# Patient Record
Sex: MALE | Race: WHITE | HISPANIC OR LATINO | Employment: FULL TIME | URBAN - METROPOLITAN AREA
[De-identification: names, ages, dates, MRNs, and addresses within clinical notes are randomized per-mention and may not be internally consistent; named-entity substitution may affect disease eponyms.]

---

## 2019-01-11 ENCOUNTER — OFFICE VISIT (OUTPATIENT)
Dept: INTERNAL MEDICINE | Facility: CLINIC | Age: 34
End: 2019-01-11
Payer: OTHER GOVERNMENT

## 2019-01-11 VITALS
HEART RATE: 78 BPM | DIASTOLIC BLOOD PRESSURE: 94 MMHG | OXYGEN SATURATION: 99 % | SYSTOLIC BLOOD PRESSURE: 140 MMHG | BODY MASS INDEX: 26.52 KG/M2 | WEIGHT: 175 LBS | HEIGHT: 68 IN | TEMPERATURE: 99 F

## 2019-01-11 DIAGNOSIS — F90.9 ATTENTION DEFICIT HYPERACTIVITY DISORDER (ADHD), UNSPECIFIED ADHD TYPE: ICD-10-CM

## 2019-01-11 DIAGNOSIS — G89.29 CHRONIC LOW BACK PAIN, UNSPECIFIED BACK PAIN LATERALITY, WITH SCIATICA PRESENCE UNSPECIFIED: ICD-10-CM

## 2019-01-11 DIAGNOSIS — K21.9 GASTROESOPHAGEAL REFLUX DISEASE, ESOPHAGITIS PRESENCE NOT SPECIFIED: ICD-10-CM

## 2019-01-11 DIAGNOSIS — M51.26 HERNIATED LUMBAR INTERVERTEBRAL DISC: ICD-10-CM

## 2019-01-11 DIAGNOSIS — Z91.09 ENVIRONMENTAL ALLERGIES: ICD-10-CM

## 2019-01-11 DIAGNOSIS — E66.3 OVERWEIGHT (BMI 25.0-29.9): ICD-10-CM

## 2019-01-11 DIAGNOSIS — I10 ESSENTIAL HYPERTENSION: ICD-10-CM

## 2019-01-11 DIAGNOSIS — M54.5 CHRONIC LOW BACK PAIN, UNSPECIFIED BACK PAIN LATERALITY, WITH SCIATICA PRESENCE UNSPECIFIED: ICD-10-CM

## 2019-01-11 DIAGNOSIS — E78.5 HYPERLIPIDEMIA, UNSPECIFIED HYPERLIPIDEMIA TYPE: ICD-10-CM

## 2019-01-11 DIAGNOSIS — F41.9 ANXIETY: ICD-10-CM

## 2019-01-11 PROBLEM — M54.50 CHRONIC LOW BACK PAIN: Status: ACTIVE | Noted: 2019-01-11

## 2019-01-11 PROCEDURE — 99999 PR PBB SHADOW E&M-NEW PATIENT-LVL III: CPT | Mod: PBBFAC,,, | Performed by: FAMILY MEDICINE

## 2019-01-11 PROCEDURE — 99385 PREV VISIT NEW AGE 18-39: CPT | Mod: S$GLB,,, | Performed by: FAMILY MEDICINE

## 2019-01-11 PROCEDURE — 99385 PR PREVENTIVE VISIT,NEW,18-39: ICD-10-PCS | Mod: S$GLB,,, | Performed by: FAMILY MEDICINE

## 2019-01-11 PROCEDURE — 99999 PR PBB SHADOW E&M-NEW PATIENT-LVL III: ICD-10-PCS | Mod: PBBFAC,,, | Performed by: FAMILY MEDICINE

## 2019-01-11 RX ORDER — OMEPRAZOLE 40 MG/1
40 CAPSULE, DELAYED RELEASE ORAL DAILY
Refills: 1 | COMMUNITY
Start: 2018-12-08 | End: 2019-01-22 | Stop reason: SDUPTHER

## 2019-01-11 RX ORDER — FLUOXETINE 10 MG/1
10 TABLET ORAL DAILY
COMMUNITY
End: 2020-01-10

## 2019-01-11 RX ORDER — METOPROLOL TARTRATE 50 MG/1
50 TABLET ORAL 2 TIMES DAILY
COMMUNITY
End: 2019-01-11 | Stop reason: SDUPTHER

## 2019-01-11 RX ORDER — METOPROLOL TARTRATE 50 MG/1
50 TABLET ORAL 2 TIMES DAILY
Qty: 180 TABLET | Refills: 3 | Status: SHIPPED | OUTPATIENT
Start: 2019-01-11 | End: 2020-01-10 | Stop reason: SDUPTHER

## 2019-01-11 RX ORDER — LEVOCETIRIZINE DIHYDROCHLORIDE 5 MG/1
5 TABLET, FILM COATED ORAL DAILY
Refills: 9 | COMMUNITY
Start: 2018-11-23 | End: 2020-02-04 | Stop reason: SDUPTHER

## 2019-01-11 RX ORDER — AZELASTINE HYDROCHLORIDE, FLUTICASONE PROPIONATE 137; 50 UG/1; UG/1
1 SPRAY, METERED NASAL 2 TIMES DAILY
COMMUNITY
End: 2020-06-24
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
## 2019-01-13 ENCOUNTER — PATIENT MESSAGE (OUTPATIENT)
Dept: INTERNAL MEDICINE | Facility: CLINIC | Age: 34
End: 2019-01-13

## 2019-01-15 ENCOUNTER — TELEPHONE (OUTPATIENT)
Dept: INTERNAL MEDICINE | Facility: CLINIC | Age: 34
End: 2019-01-15

## 2019-01-15 ENCOUNTER — LAB VISIT (OUTPATIENT)
Dept: LAB | Facility: HOSPITAL | Age: 34
End: 2019-01-15
Payer: OTHER GOVERNMENT

## 2019-01-15 DIAGNOSIS — I10 ESSENTIAL HYPERTENSION: ICD-10-CM

## 2019-01-15 DIAGNOSIS — R74.01 ELEVATED ALT MEASUREMENT: Primary | ICD-10-CM

## 2019-01-15 DIAGNOSIS — E78.5 HYPERLIPIDEMIA, UNSPECIFIED HYPERLIPIDEMIA TYPE: ICD-10-CM

## 2019-01-15 LAB
ALBUMIN SERPL BCP-MCNC: 4.2 G/DL
ALP SERPL-CCNC: 53 U/L
ALT SERPL W/O P-5'-P-CCNC: 82 U/L
ANION GAP SERPL CALC-SCNC: 8 MMOL/L
AST SERPL-CCNC: 38 U/L
BASOPHILS # BLD AUTO: 0.02 K/UL
BASOPHILS NFR BLD: 0.4 %
BILIRUB SERPL-MCNC: 0.5 MG/DL
BUN SERPL-MCNC: 17 MG/DL
CALCIUM SERPL-MCNC: 9.2 MG/DL
CHLORIDE SERPL-SCNC: 102 MMOL/L
CHOLEST SERPL-MCNC: 288 MG/DL
CHOLEST/HDLC SERPL: 7.2 {RATIO}
CO2 SERPL-SCNC: 27 MMOL/L
CREAT SERPL-MCNC: 0.8 MG/DL
DIFFERENTIAL METHOD: NORMAL
EOSINOPHIL # BLD AUTO: 0.3 K/UL
EOSINOPHIL NFR BLD: 5.1 %
ERYTHROCYTE [DISTWIDTH] IN BLOOD BY AUTOMATED COUNT: 12.7 %
EST. GFR  (AFRICAN AMERICAN): >60 ML/MIN/1.73 M^2
EST. GFR  (NON AFRICAN AMERICAN): >60 ML/MIN/1.73 M^2
GLUCOSE SERPL-MCNC: 103 MG/DL
HCT VFR BLD AUTO: 43.1 %
HDLC SERPL-MCNC: 40 MG/DL
HDLC SERPL: 13.9 %
HGB BLD-MCNC: 14.6 G/DL
LDLC SERPL CALC-MCNC: 184.8 MG/DL
LYMPHOCYTES # BLD AUTO: 1.3 K/UL
LYMPHOCYTES NFR BLD: 26.3 %
MCH RBC QN AUTO: 30.2 PG
MCHC RBC AUTO-ENTMCNC: 33.9 G/DL
MCV RBC AUTO: 89 FL
MONOCYTES # BLD AUTO: 0.4 K/UL
MONOCYTES NFR BLD: 7.9 %
NEUTROPHILS # BLD AUTO: 3 K/UL
NEUTROPHILS NFR BLD: 60.1 %
NONHDLC SERPL-MCNC: 248 MG/DL
PLATELET # BLD AUTO: 202 K/UL
PMV BLD AUTO: 12.1 FL
POTASSIUM SERPL-SCNC: 3.8 MMOL/L
PROT SERPL-MCNC: 7.2 G/DL
RBC # BLD AUTO: 4.83 M/UL
SODIUM SERPL-SCNC: 137 MMOL/L
TRIGL SERPL-MCNC: 316 MG/DL
WBC # BLD AUTO: 5.05 K/UL

## 2019-01-15 PROCEDURE — 80061 LIPID PANEL: CPT

## 2019-01-15 PROCEDURE — 80053 COMPREHEN METABOLIC PANEL: CPT

## 2019-01-15 PROCEDURE — 36415 COLL VENOUS BLD VENIPUNCTURE: CPT

## 2019-01-15 PROCEDURE — 85025 COMPLETE CBC W/AUTO DIFF WBC: CPT

## 2019-01-15 RX ORDER — PRAVASTATIN SODIUM 20 MG/1
20 TABLET ORAL DAILY
Qty: 90 TABLET | Refills: 3 | Status: SHIPPED | OUTPATIENT
Start: 2019-01-15 | End: 2019-02-14

## 2019-01-15 RX ORDER — ATORVASTATIN CALCIUM 20 MG/1
20 TABLET, FILM COATED ORAL DAILY
Qty: 90 TABLET | Refills: 3 | Status: SHIPPED | OUTPATIENT
Start: 2019-01-15 | End: 2019-01-15 | Stop reason: ALTCHOICE

## 2019-01-15 NOTE — TELEPHONE ENCOUNTER
Message left for pt to call office  Changed Rx to pravastatin & sent electronically; please contact patient's pharmacy and update them of this change and cancel atorvastatin Rx.  Pravastatin less likely to cause cramps.  Please notify patient once complete.

## 2019-01-15 NOTE — TELEPHONE ENCOUNTER
----- Message from Juan Ley MD sent at 1/15/2019  1:25 PM CST -----  Results reviewed.  Elevated liver enzyme noted with ALT elevated at 82; will need ABD US and hepatitis panel for further evaluation (ordered electronically).  Also noted to have significantly elevated total cholesterol at 288 (normal < 200), LDL cholesterol at 184 (normal < 160), and triglycerides at 316 (normal 30 - 150).  I would recommend the patient start a daily cholesterol medication at this time, atorvastatin 20 mg by mouth daily; this has been sent electronically.  This Rx may cause muscle cramps, please notify me if this occurs.  CBC within normal limits.  Please notify patient.

## 2019-01-15 NOTE — TELEPHONE ENCOUNTER
----- Message from Lyssa Blevins sent at 1/15/2019  2:20 PM CST -----  Contact: Pt Mobile [phone]   Patient is returning a phone call.  Who left a message for the patient: Maribel   Does patient know what this is regarding:  A message from Maribel   Comments:

## 2019-01-15 NOTE — TELEPHONE ENCOUNTER
Spoke with pt, notified him of results, pt verbalized understanding. Appts  scheduled. Pt asked to resend a different statin since he has already been on thi atorvastatin and it caused him leg cramps. Pt states that he is willing to get on the statin only for the short period of time and doesn't want to be on it all the time.

## 2019-01-15 NOTE — TELEPHONE ENCOUNTER
Changed Rx to pravastatin & sent electronically; please contact patient's pharmacy and update them of this change and cancel atorvastatin Rx.  Pravastatin less likely to cause cramps.  Please notify patient once complete.

## 2019-01-16 ENCOUNTER — TELEPHONE (OUTPATIENT)
Dept: INTERNAL MEDICINE | Facility: CLINIC | Age: 34
End: 2019-01-16

## 2019-01-16 NOTE — TELEPHONE ENCOUNTER
Can be temporary with appropriate diet and lifestyle modifications.  Can discuss going off this medication and recheck of lipids in 6 months if patient would like, would need follow up appointment at that time.  Please notify patient.

## 2019-01-16 NOTE — TELEPHONE ENCOUNTER
----- Message from Natalie Ansari sent at 1/15/2019  3:48 PM CST -----  Contact: self/[phone]  Type: Returning a call    Who left a message? Yany Turner     When did the practice call? Today     Comments: Please advise.        Thanks

## 2019-01-16 NOTE — TELEPHONE ENCOUNTER
Pt wants to know if the cholesterol medication is temporary until his numbers improve or do he have to take this long term   appts scheduled

## 2019-01-16 NOTE — TELEPHONE ENCOUNTER
Message left for pt to call office  Hep panel and ABD US ordered; please process and notify patient once complete

## 2019-01-22 ENCOUNTER — PATIENT MESSAGE (OUTPATIENT)
Dept: INTERNAL MEDICINE | Facility: CLINIC | Age: 34
End: 2019-01-22

## 2019-01-22 RX ORDER — OMEPRAZOLE 40 MG/1
40 CAPSULE, DELAYED RELEASE ORAL DAILY
Qty: 30 CAPSULE | Refills: 11 | Status: SHIPPED | OUTPATIENT
Start: 2019-01-22 | End: 2020-01-10 | Stop reason: SDUPTHER

## 2019-01-26 ENCOUNTER — HOSPITAL ENCOUNTER (OUTPATIENT)
Dept: RADIOLOGY | Facility: HOSPITAL | Age: 34
Discharge: HOME | End: 2019-01-26
Attending: FAMILY MEDICINE
Payer: OTHER GOVERNMENT

## 2019-01-26 DIAGNOSIS — R74.01 ELEVATED ALT MEASUREMENT: ICD-10-CM

## 2019-01-26 PROCEDURE — 76700 US ABDOMEN COMPLETE: ICD-10-PCS | Mod: 26,,, | Performed by: RADIOLOGY

## 2019-01-26 PROCEDURE — 76700 US EXAM ABDOM COMPLETE: CPT | Mod: TC

## 2019-01-26 PROCEDURE — 76700 US EXAM ABDOM COMPLETE: CPT | Mod: 26,,, | Performed by: RADIOLOGY

## 2019-02-02 ENCOUNTER — OFFICE VISIT (OUTPATIENT)
Dept: URGENT CARE | Facility: CLINIC | Age: 34
End: 2019-02-02
Payer: OTHER GOVERNMENT

## 2019-02-02 ENCOUNTER — PATIENT MESSAGE (OUTPATIENT)
Dept: INTERNAL MEDICINE | Facility: CLINIC | Age: 34
End: 2019-02-02

## 2019-02-02 VITALS
HEART RATE: 83 BPM | BODY MASS INDEX: 24.1 KG/M2 | OXYGEN SATURATION: 97 % | SYSTOLIC BLOOD PRESSURE: 140 MMHG | TEMPERATURE: 98 F | RESPIRATION RATE: 16 BRPM | DIASTOLIC BLOOD PRESSURE: 90 MMHG | HEIGHT: 68 IN | WEIGHT: 159 LBS

## 2019-02-02 DIAGNOSIS — R25.2 MUSCLE CRAMPS: ICD-10-CM

## 2019-02-02 DIAGNOSIS — T88.7XXA SIDE EFFECT OF MEDICATION: Primary | ICD-10-CM

## 2019-02-02 PROCEDURE — 99214 PR OFFICE/OUTPT VISIT, EST, LEVL IV, 30-39 MIN: ICD-10-PCS | Mod: S$GLB,,, | Performed by: NURSE PRACTITIONER

## 2019-02-02 PROCEDURE — 99214 OFFICE O/P EST MOD 30 MIN: CPT | Mod: S$GLB,,, | Performed by: NURSE PRACTITIONER

## 2019-02-02 NOTE — PROGRESS NOTES
"Subjective:       Patient ID: Manoj Gutierrez is a 33 y.o. male.    Vitals:  height is 5' 7.5" (1.715 m) and weight is 72.1 kg (159 lb). His temperature is 98.3 °F (36.8 °C). His blood pressure is 140/90 (abnormal) and his pulse is 83. His respiration is 16 and oxygen saturation is 97%.     Chief Complaint: Leg Pain    33-year-old male presents today with bilateral leg pain and calf  cramping  He states he runs 4-5 miles per day.   Reports eating a well-balanced meal and remains hydrated.He started with a new medication (pravastatin) in over 2 weeks ago and reports that the symptoms started afterwards.    Calf cramping that is pretty is constant. Pt states it started on Wednesday. Pt states right leg is sore but the left leg is the worse.  Denies unilateral leg swelling.          Constitution: Negative for chills, fatigue and fever.   HENT: Negative for congestion and sore throat.    Neck: Negative for painful lymph nodes.   Cardiovascular: Negative for chest pain and leg swelling.   Eyes: Negative for double vision and blurred vision.   Respiratory: Negative for cough and shortness of breath.    Gastrointestinal: Negative for nausea, vomiting and diarrhea.   Genitourinary: Negative for dysuria, frequency and urgency.   Musculoskeletal: Negative for joint pain, joint swelling, muscle cramps and muscle ache.   Skin: Negative for color change, pale and rash.   Allergic/Immunologic: Negative for seasonal allergies.   Neurological: Negative for dizziness, history of vertigo, light-headedness, passing out and headaches.   Hematologic/Lymphatic: Negative for swollen lymph nodes, easy bruising/bleeding and history of blood clots. Does not bruise/bleed easily.   Psychiatric/Behavioral: Negative for nervous/anxious, sleep disturbance and depression. The patient is not nervous/anxious.        Objective:      Physical Exam   Constitutional: He is oriented to person, place, and time. He appears well-developed and " well-nourished.   Cardiovascular: Normal rate, regular rhythm, normal heart sounds and intact distal pulses.   Pulmonary/Chest: Effort normal and breath sounds normal.   Musculoskeletal: Normal range of motion. He exhibits no tenderness.     Negative Homans sign bilaterally.   No unilateral leg swelling noted.   Neurological: He is alert and oriented to person, place, and time.   Skin: Skin is warm and dry. Capillary refill takes less than 2 seconds.   Psychiatric: He has a normal mood and affect. His behavior is normal. Judgment and thought content normal.   Nursing note and vitals reviewed.      Assessment:       1. Side effect of medication    2. Muscle cramps        Plan:         Side effect of medication    Muscle cramps            Patient Instructions      Speak to her primary care physician on Monday regarding medication.    You must understand that you've received an Urgent Care treatment only and that you may be released before all your medical problems are known or treated. You, the patient, will arrange for follow up care as instructed.  If your condition worsens we recommend that you receive another evaluation at the emergency room immediately or contact your primary medical clinics after hours call service to discuss your concerns.  Please return here or go to the Emergency Department for any concerns or worsening of condition.      Muscle Spasm  A muscle spasm (also called a cramp) is an involuntary muscle contraction. The muscle tightens quickly and strongly. A hard lump may form in the muscle. Muscle spasms are very painful. Read on to learn more about muscle spasms and how to treat and prevent them.    What causes muscles to spasm?  Often, the cause of a muscle spasm is not known. Muscle spasm is due to irritation of muscle fibers. Some things can make a muscle spasm more likely. These include:  · Injury  · Heavy exercise  · Overtired muscles  · A muscle held in one position for a long  time  · Dehydration  · Low levels of certain minerals in the body  · Taking certain medications, such as diuretics or water pills  · Certain medical conditions, such as kidney failure or diabetes  · Being pregnant  Stopping a muscle spasm  Muscle spasms often come and go quickly. When a muscle goes into spasm, very gently stretch and massage the muscle. This may help calm the muscle fibers. Then rest the muscle.  Preventing muscle spasms  Although there is little or no evidence that staying hydrated, taking certain vitamins or minerals or stretching works to prevent cramps, these measures may help and have other benefits. Talk to your health care provider about steps to take to avoid muscle spasms. These may include:  · Drinking enough fluids to avoid dehydration, especially when you exercise.  · Taking vitamin or mineral supplements.  · Getting regular exercise.  · Stretching regularly, especially before exercise.  · Limit caffeine and smoking.  · Taking a prescription muscle relaxant.  When to call your doctor  Call your doctor if you have any of the following:  · Severe cramping  · Cramping that lasts a long time, does not go away with stretching, or keeps coming back  · Pain, tingling, or weakness in the arms or legs  · Pain that wakes you up at night   Date Last Reviewed: 9/1/2015  © 6049-2381 [employer]. [address] [place] [address], [place] [address]. All rights reserved. This information is not intended as a substitute for professional medical care. Always follow your healthcare professional's instructions.

## 2019-02-02 NOTE — LETTER
February 2, 2019      [facility]  [address] [place] [address]  Phone: [phone]  Fax: [phone]       Patient: Manoj Gutierrez   YOB: 1985  Date of Visit: 02/02/2019    To Whom It May Concern:    Dov Gutierrez  was at [facility] on 02/02/2019. He may return to work/school on 02/03/2019 with no restrictions. If you have any questions or concerns, or if I can be of further assistance, please do not hesitate to contact me.    Sincerely,      Isabela Romero NP

## 2019-02-03 NOTE — PATIENT INSTRUCTIONS
Speak to her primary care physician on Monday regarding medication.    You must understand that you've received an Urgent Care treatment only and that you may be released before all your medical problems are known or treated. You, the patient, will arrange for follow up care as instructed.  If your condition worsens we recommend that you receive another evaluation at the emergency room immediately or contact your primary medical clinics after hours call service to discuss your concerns.  Please return here or go to the Emergency Department for any concerns or worsening of condition.      Muscle Spasm  A muscle spasm (also called a cramp) is an involuntary muscle contraction. The muscle tightens quickly and strongly. A hard lump may form in the muscle. Muscle spasms are very painful. Read on to learn more about muscle spasms and how to treat and prevent them.    What causes muscles to spasm?  Often, the cause of a muscle spasm is not known. Muscle spasm is due to irritation of muscle fibers. Some things can make a muscle spasm more likely. These include:  · Injury  · Heavy exercise  · Overtired muscles  · A muscle held in one position for a long time  · Dehydration  · Low levels of certain minerals in the body  · Taking certain medications, such as diuretics or water pills  · Certain medical conditions, such as kidney failure or diabetes  · Being pregnant  Stopping a muscle spasm  Muscle spasms often come and go quickly. When a muscle goes into spasm, very gently stretch and massage the muscle. This may help calm the muscle fibers. Then rest the muscle.  Preventing muscle spasms  Although there is little or no evidence that staying hydrated, taking certain vitamins or minerals or stretching works to prevent cramps, these measures may help and have other benefits. Talk to your health care provider about steps to take to avoid muscle spasms. These may include:  · Drinking enough fluids to avoid dehydration, especially  when you exercise.  · Taking vitamin or mineral supplements.  · Getting regular exercise.  · Stretching regularly, especially before exercise.  · Limit caffeine and smoking.  · Taking a prescription muscle relaxant.  When to call your doctor  Call your doctor if you have any of the following:  · Severe cramping  · Cramping that lasts a long time, does not go away with stretching, or keeps coming back  · Pain, tingling, or weakness in the arms or legs  · Pain that wakes you up at night   Date Last Reviewed: 9/1/2015  © 3781-6584 [employer]. [address] [place] [address]. All rights reserved. This information is not intended as a substitute for professional medical care. Always follow your healthcare professional's instructions.

## 2019-02-14 ENCOUNTER — OFFICE VISIT (OUTPATIENT)
Dept: INTERNAL MEDICINE | Facility: CLINIC | Age: 34
End: 2019-02-14
Payer: OTHER GOVERNMENT

## 2019-02-14 VITALS
OXYGEN SATURATION: 100 % | HEART RATE: 97 BPM | SYSTOLIC BLOOD PRESSURE: 126 MMHG | TEMPERATURE: 98 F | WEIGHT: 173 LBS | DIASTOLIC BLOOD PRESSURE: 80 MMHG | HEIGHT: 68 IN | BODY MASS INDEX: 26.22 KG/M2

## 2019-02-14 DIAGNOSIS — E78.5 HYPERLIPIDEMIA, UNSPECIFIED HYPERLIPIDEMIA TYPE: Primary | ICD-10-CM

## 2019-02-14 PROCEDURE — 99213 OFFICE O/P EST LOW 20 MIN: CPT | Mod: S$GLB,,, | Performed by: FAMILY MEDICINE

## 2019-02-14 PROCEDURE — 99999 PR PBB SHADOW E&M-EST. PATIENT-LVL IV: CPT | Mod: PBBFAC,,, | Performed by: FAMILY MEDICINE

## 2019-02-14 PROCEDURE — 99999 PR PBB SHADOW E&M-EST. PATIENT-LVL IV: ICD-10-PCS | Mod: PBBFAC,,, | Performed by: FAMILY MEDICINE

## 2019-02-14 PROCEDURE — 99213 PR OFFICE/OUTPT VISIT, EST, LEVL III, 20-29 MIN: ICD-10-PCS | Mod: S$GLB,,, | Performed by: FAMILY MEDICINE

## 2019-02-14 RX ORDER — ATORVASTATIN CALCIUM 20 MG/1
TABLET, FILM COATED ORAL
COMMUNITY
Start: 2019-01-15 | End: 2019-02-14 | Stop reason: ALTCHOICE

## 2019-02-14 NOTE — PATIENT INSTRUCTIONS
Lifestyle Changes to Control Cholesterol  You can control your cholesterol through diet, exercise, weight management, quitting smoking, stress management, and taking your medicines right. These things can also lower your risk for cardiovascular disease.    Eating healthy  Your healthcare provider will give you information on diet changes you may need to make. Your provider may recommend that you see a registered dietitian for help with diet changes. Changes may include:  · Cutting back on the amount of fat and cholesterol in your meals  · Eating less salt (sodium). This is especially important if you have high blood pressure.  · Eating more fresh vegetables and fruits  · Eating lean proteins such as fish, poultry, beans, and peas  · Eating less red meat and processed meats  · Using low-fat dairy products  · Using vegetable and nut oils in limited amounts  · Limiting how many sweets and processed foods like chips, cookies, and baked goods that you eat   · Limiting how many sugar-sweetened beverages you drink  · Limiting how often you eat out  Getting exercise  Regular exercise is a good way to help your body control cholesterol. Regular exercise can help in many ways. It can:  · Raise your good cholesterol  · Help lower your bad cholesterol  · Let blood flow better through your body  · Give more oxygen to your muscles and tissues  · Help you manage your weight  · Help your heart pump better  · Lower your blood pressure  Your healthcare provider may recommend that you get more physical activity if you haven't been active. Your provider may recommend that you get moderate to vigorous physical activity for at least 40 minutes each day. You should do this for at least 3 to 4 days each week. A few examples of moderate to vigorous activity are:  · Walking at a brisk pace. This is about 3 to 4 miles per hour.  · Jogging or running  · Swimming or water aerobics  · Hiking  · Dancing  · Martial arts  · Tennis  · Riding a  bicycle or stationary bike  · Dancing  Managing your weight  If you are overweight or obese, your healthcare provider will work with you to help you lose weight and lower your BMI (body mass index). Making diet changes and getting more physical activity can help. Changing your diet will help you lose weight more easily than adding exercise.  Quitting smoking  Smoking and other tobacco use can raise cholesterol and make it harder to control. Quitting is tough. But millions of people have given up tobacco for good. You can quit, too! Think about some of the reasons below to quit smoking. Do any of them make you think twice about your smoking habit?  Stop smoking because it:  · Keeps your cholesterol high, even if you make all the other changes youre supposed to  · Damages your body. It especially harms your heart, lungs, skin, and blood vessels.  · Makes you more likely to have a heart attack (acute myocardial infarction), stroke, or cancer  · Stains your teeth  · Makes your skin, clothes, and breath smell bad  · Costs a lot of money  Controlling stress   Learn ways to control stress. This will help you deal with stress in your home and work life. Controlling stress can greatly lower your risk of getting cardiovascular disease.  Making the most of medicines  Healthy eating and exercise are a good start to keeping your cholesterol down. But you may need some extra help from medicine. If your doctor prescribes medicine, be sure to take it exactly as directed. Remember:  · Tell your healthcare provider about all other medicines you take. This includes vitamins and herbs.  · Tell your healthcare provider if you have any side effects after starting to take a medicine. Examples of side effects to watch for include muscle aches, weakness, blurred vision, rust-colored urine, yellowing of eyes or skin (jaundice), and headache.  · Dont skip a dose or stop taking your medicine because you feel better or because  your cholesterol numbers go down. Never stop taking your medicine unless your healthcare provider has told you its OK.  · Ask your healthcare provider if you have any questions about your medicines.  High risk groups  Some people may need to take medicines called statins to control their cholesterol. This is in addition to eating a healthy diet and getting regular exercise.  Statins can help you stay healthy. They can also help prevent a heart attack or stroke. You may need to take a statin if you are in one of these groups:  · Adults who have had a heart attack or stroke. Or adults who have had peripheral vascular disease, a ministroke (transient ischemic attack), or stable or unstable angina. This group also includes people who have had a procedure to restore blood flow through a blocked artery. These procedures include percutaneous coronary intervention, angioplasty, stent, and open-heart bypass surgery.  · Adults who have diabetes. Or adults who are at higher risk of having a heart attack or stroke and have an LDL cholesterol level of 70 to 189 mg/dL  · Adults who are 21 years old or older and have an LDL cholesterol level of 190 mg/dL or higher.  If you are in a high-risk group, talk with your healthcare provider about your treatment goals. Make sure you understand why these goals are important, based on your own health history and your family history of heart disease or high cholesterol.  Make a plan to have regular cholesterol checks. You may need to fast before getting this test. Also ask your provider about any side effects your medicines may cause. Let your provider know about any side effects you have. You may need to take more than one medicine to reach the cholesterol goals that you and your provider decide on.  Date Last Reviewed: 10/1/2016  © 9731-2902 [employer]. [address] [place] [address], [place] [address]. All rights reserved. This information is not intended as a substitute for  professional medical care. Always follow your healthcare professional's instructions.

## 2019-02-14 NOTE — PROGRESS NOTES
Subjective:      Patient ID: Manoj Gutierrez is a 33 y.o. male.    Chief Complaint: Follow-up      HPI:  Manoj Gutierrez is a 33 year old male with ADHD, back pain, environmental allergies, GERD, hyperlipidemia, and hypertension who presents to clinic today for follow up on hyperlipidemia.    Unable to tolerate statins (atorvastatin and pravastatin) 2/2 myopathy.  Tried coenzyme Q 10 in addition to his statin but this did not improve his myalgias.  .8, total cholesterol 288 on lipid panel 1/15/19.  Enquires about Zetia.  Has elevated liver enzymes as well.  Runs regularly.  Eats 3+ avocados daily.  Has tried diet and lifestyle modifications with 2 previous doctors for his hyperlipidemia with only minimal improvement.      Past Medical History:   Diagnosis Date    Elevated transaminase level     Hyperlipidemia     Overweight (BMI 25.0-29.9)        History reviewed. No pertinent surgical history.    History reviewed. No pertinent family history.    Social History     Socioeconomic History    Marital status: Single     Spouse name: None    Number of children: None    Years of education: None    Highest education level: None   Social Needs    Financial resource strain: None    Food insecurity - worry: None    Food insecurity - inability: None    Transportation needs - medical: None    Transportation needs - non-medical: None   Occupational History    None   Tobacco Use    Smoking status: Never Smoker    Smokeless tobacco: Never Used   Substance and Sexual Activity    Alcohol use: Yes     Frequency: 2-3 times a week     Drinks per session: 3 or 4    Drug use: No    Sexual activity: No   Other Topics Concern    None   Social History Narrative    None       Review of Systems   Constitutional: Negative for activity change, chills, fatigue, fever and unexpected weight change.   HENT: Negative for congestion, hearing loss, nosebleeds, sore throat and trouble swallowing.    Eyes: Negative for pain  "and visual disturbance.   Respiratory: Negative for cough, chest tightness, shortness of breath and wheezing.    Cardiovascular: Negative for chest pain and palpitations.   Gastrointestinal: Negative for abdominal distention, abdominal pain, blood in stool, constipation, diarrhea, nausea and vomiting.   Endocrine: Negative for polydipsia and polyuria.   Genitourinary: Negative for difficulty urinating, dysuria, frequency, hematuria and urgency.   Musculoskeletal: Negative for arthralgias, back pain, joint swelling, myalgias and neck pain.   Skin: Negative for color change and rash.   Neurological: Negative for dizziness, syncope, speech difficulty, weakness, numbness and headaches.   Psychiatric/Behavioral: Negative for agitation, behavioral problems, confusion and dysphoric mood. The patient is not nervous/anxious.      Objective:     Vitals:    02/14/19 1627   BP: 126/80   BP Location: Right arm   Patient Position: Sitting   BP Method: Medium (Manual)   Pulse: 97   Temp: 98.2 °F (36.8 °C)   TempSrc: Oral   SpO2: 100%   Weight: 78.5 kg (173 lb)   Height: 5' 7.5" (1.715 m)       Physical Exam   Constitutional: He appears well-developed and well-nourished. He is cooperative. No distress.   HENT:   Head: Normocephalic and atraumatic.   Right Ear: Hearing and external ear normal.   Left Ear: Hearing and external ear normal.   Nose: Nose normal. No rhinorrhea. No epistaxis.   Mouth/Throat: Oropharynx is clear and moist and mucous membranes are normal. No oral lesions.   Eyes: Conjunctivae, EOM and lids are normal. Pupils are equal, round, and reactive to light. Right eye exhibits no discharge. Left eye exhibits no discharge.   Neck: Trachea normal and normal range of motion. Neck supple. No tracheal deviation present.   Cardiovascular: Normal rate, regular rhythm and normal heart sounds. Exam reveals no gallop and no friction rub.   No murmur heard.  Pulmonary/Chest: Effort normal and breath sounds normal. No " respiratory distress. He has no wheezes. He has no rales.   Abdominal: Soft. Bowel sounds are normal. He exhibits no distension. There is no tenderness. There is no rebound and no guarding.   Musculoskeletal: Normal range of motion. He exhibits no edema or deformity.   Neurological: He is alert. No cranial nerve deficit. He exhibits normal muscle tone.   Skin: Skin is warm and dry. No rash noted.   Psychiatric: He has a normal mood and affect. His speech is normal and behavior is normal. Judgment and thought content normal. Cognition and memory are normal.   Nursing note and vitals reviewed.     Assessment:      1. Hyperlipidemia, unspecified hyperlipidemia type      Plan:   Manoj was seen today for follow-up.    Diagnoses and all orders for this visit:    Hyperlipidemia, unspecified hyperlipidemia type  -     Start evolocumab (REPATHA SURECLICK) 140 mg/mL PnIj; Inject 1 mL (140 mg total) into the skin every 14 (fourteen) days.  Counseled patient on the importance of a low fat/low cholesterol diet; recommended decreasing daily avocado intake.  Continue daily aerobic exercise.

## 2019-03-07 ENCOUNTER — TELEPHONE (OUTPATIENT)
Dept: INTERNAL MEDICINE | Facility: CLINIC | Age: 34
End: 2019-03-07

## 2019-03-07 NOTE — TELEPHONE ENCOUNTER
Received fax from [facility] notifying that a PA is required for Repatha but that [facility] was instructed by the insurer that [facility] is not permitted to submit request for PA.  Please contact [employer] at Phone:  [phone] or fax [phone] to submit request for PA for Repatha Auto-[employer]j [employer].  Form on your desk.

## 2019-03-07 NOTE — TELEPHONE ENCOUNTER
Your demographic data has been sent to the plan successfully. They will respond with your clinical questions and you will be notified by email when available within the next business day. You can also check for an update later by opening this request from your dashboard. Please do not fax or call the plan to resubmit this request.

## 2019-03-11 ENCOUNTER — TELEPHONE (OUTPATIENT)
Dept: INTERNAL MEDICINE | Facility: CLINIC | Age: 34
End: 2019-03-11

## 2019-03-11 ENCOUNTER — PATIENT MESSAGE (OUTPATIENT)
Dept: INTERNAL MEDICINE | Facility: CLINIC | Age: 34
End: 2019-03-11

## 2019-03-11 NOTE — TELEPHONE ENCOUNTER
----- Message from Gabby Gutierrez sent at 3/11/2019  1:37 PM CDT -----  Contact: Nicki/ Pharmacist [phone]   You sent an electronic PA request  for Repatha and the  has changed and only the NDC code  555 is available and different questions will come up so there is a chance of getting it approved. The Repatha with NDC code of 725

## 2019-03-11 NOTE — TELEPHONE ENCOUNTER
PA for Repatha is approved for 12 months, approval code # [id number]. Pharmacy notified.Copay assisstance # [phone]. ( pt has copay of $200. Left message for pt to call back.

## 2019-03-11 NOTE — TELEPHONE ENCOUNTER
Patient sent message enquiring status of Repatha authorization.  Have you heard anything?  If not, please contact patients pharmacy/insurance provider to see what the status of this is.

## 2019-09-12 RX ORDER — EVOLOCUMAB 140 MG/ML
INJECTION, SOLUTION SUBCUTANEOUS
Qty: 2 ML | Refills: 11 | Status: SHIPPED | OUTPATIENT
Start: 2019-09-12 | End: 2019-11-13 | Stop reason: SDUPTHER

## 2019-10-28 ENCOUNTER — PATIENT MESSAGE (OUTPATIENT)
Dept: INTERNAL MEDICINE | Facility: CLINIC | Age: 34
End: 2019-10-28

## 2019-10-28 NOTE — LETTER
October 28, 2019    Manoj Gutierrez  [address]  [place] [address]             [facility]  [address]  [facility] [address]  Phone: [phone]  Fax: [phone] To whom it may concern:    Mr. Manoj Gutierrez is a patient currently under my care.   will be traveling out of the country from 12/12/19 to 1/1/2020.   needs to be able to transport a dose of Repatha, an injectable medication that requires refrigeration, while travelling.    Please make accommodations for  to travel with his medication.    If you have any questions or concerns, please don't hesitate to call.    Sincerely,        Juan Ley MD

## 2019-11-13 DIAGNOSIS — E78.5 HYPERLIPIDEMIA, UNSPECIFIED HYPERLIPIDEMIA TYPE: Primary | ICD-10-CM

## 2019-11-13 NOTE — TELEPHONE ENCOUNTER
----- Message from Reed Denise sent at 11/13/2019 10:52 AM CST -----  Contact: [facility]   Pharmacy is calling to clarify an RX.  RX name:  REPATHA SURECLICK 140 mg/mL PnIj  What do they need to clarify:  Needs refill   Comments: [facility] called in request this be filled in at the local [facility] he cannot get in touch with  [facility] - [place], [place] - [facility] [address] [phone] (Phone)  [phone] (Fax)

## 2020-01-10 ENCOUNTER — OFFICE VISIT (OUTPATIENT)
Dept: INTERNAL MEDICINE | Facility: CLINIC | Age: 35
End: 2020-01-10
Payer: OTHER GOVERNMENT

## 2020-01-10 ENCOUNTER — IMMUNIZATION (OUTPATIENT)
Dept: PHARMACY | Facility: CLINIC | Age: 35
End: 2020-01-10
Payer: OTHER GOVERNMENT

## 2020-01-10 VITALS
HEART RATE: 75 BPM | DIASTOLIC BLOOD PRESSURE: 80 MMHG | TEMPERATURE: 98 F | HEIGHT: 68 IN | BODY MASS INDEX: 25.26 KG/M2 | SYSTOLIC BLOOD PRESSURE: 130 MMHG | OXYGEN SATURATION: 99 % | WEIGHT: 166.69 LBS

## 2020-01-10 DIAGNOSIS — M54.50 CHRONIC LOW BACK PAIN, UNSPECIFIED BACK PAIN LATERALITY, UNSPECIFIED WHETHER SCIATICA PRESENT: ICD-10-CM

## 2020-01-10 DIAGNOSIS — K76.0 HEPATIC STEATOSIS: ICD-10-CM

## 2020-01-10 DIAGNOSIS — K21.9 GASTROESOPHAGEAL REFLUX DISEASE, ESOPHAGITIS PRESENCE NOT SPECIFIED: ICD-10-CM

## 2020-01-10 DIAGNOSIS — Z00.00 ANNUAL PHYSICAL EXAM: ICD-10-CM

## 2020-01-10 DIAGNOSIS — I10 ESSENTIAL HYPERTENSION: ICD-10-CM

## 2020-01-10 DIAGNOSIS — F90.9 ATTENTION DEFICIT HYPERACTIVITY DISORDER (ADHD), UNSPECIFIED ADHD TYPE: ICD-10-CM

## 2020-01-10 DIAGNOSIS — Z91.09 ENVIRONMENTAL ALLERGIES: ICD-10-CM

## 2020-01-10 DIAGNOSIS — G89.29 CHRONIC LOW BACK PAIN, UNSPECIFIED BACK PAIN LATERALITY, UNSPECIFIED WHETHER SCIATICA PRESENT: ICD-10-CM

## 2020-01-10 DIAGNOSIS — L30.9 DERMATITIS: ICD-10-CM

## 2020-01-10 DIAGNOSIS — E78.5 HYPERLIPIDEMIA, UNSPECIFIED HYPERLIPIDEMIA TYPE: ICD-10-CM

## 2020-01-10 DIAGNOSIS — J06.9 UPPER RESPIRATORY TRACT INFECTION, UNSPECIFIED TYPE: ICD-10-CM

## 2020-01-10 DIAGNOSIS — F41.9 ANXIETY: ICD-10-CM

## 2020-01-10 PROCEDURE — 99395 PR PREVENTIVE VISIT,EST,18-39: ICD-10-PCS | Mod: S$GLB,,, | Performed by: FAMILY MEDICINE

## 2020-01-10 PROCEDURE — 99999 PR PBB SHADOW E&M-EST. PATIENT-LVL IV: CPT | Mod: PBBFAC,,, | Performed by: FAMILY MEDICINE

## 2020-01-10 PROCEDURE — 99999 PR PBB SHADOW E&M-EST. PATIENT-LVL IV: ICD-10-PCS | Mod: PBBFAC,,, | Performed by: FAMILY MEDICINE

## 2020-01-10 PROCEDURE — 99395 PREV VISIT EST AGE 18-39: CPT | Mod: S$GLB,,, | Performed by: FAMILY MEDICINE

## 2020-01-10 RX ORDER — METOPROLOL TARTRATE 50 MG/1
50 TABLET ORAL 2 TIMES DAILY
Qty: 180 TABLET | Refills: 3 | Status: SHIPPED | OUTPATIENT
Start: 2020-01-10 | End: 2020-03-06

## 2020-01-10 RX ORDER — TRIAMCINOLONE ACETONIDE 1 MG/G
CREAM TOPICAL 2 TIMES DAILY
Qty: 45 G | Refills: 2 | Status: SHIPPED | OUTPATIENT
Start: 2020-01-10

## 2020-01-10 RX ORDER — OMEPRAZOLE 40 MG/1
40 CAPSULE, DELAYED RELEASE ORAL DAILY
Qty: 90 CAPSULE | Refills: 3 | Status: SHIPPED | OUTPATIENT
Start: 2020-01-10 | End: 2020-01-27

## 2020-01-10 RX ORDER — AMLODIPINE BESYLATE 10 MG/1
TABLET ORAL
COMMUNITY
Start: 2020-01-04 | End: 2020-06-25
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
## 2020-01-17 LAB
ALBUMIN SERPL-MCNC: 4.6 G/DL (ref 3.6–5.1)
ALBUMIN/GLOB SERPL: 1.6 (CALC) (ref 1–2.5)
ALP SERPL-CCNC: 58 U/L (ref 40–115)
ALT SERPL-CCNC: 32 U/L (ref 9–46)
AST SERPL-CCNC: 24 U/L (ref 10–40)
BASOPHILS # BLD AUTO: 28 CELLS/UL (ref 0–200)
BASOPHILS NFR BLD AUTO: 0.5 %
BILIRUB SERPL-MCNC: 0.5 MG/DL (ref 0.2–1.2)
BUN SERPL-MCNC: 13 MG/DL (ref 7–25)
BUN/CREAT SERPL: NORMAL (CALC) (ref 6–22)
CALCIUM SERPL-MCNC: 9.8 MG/DL (ref 8.6–10.3)
CHLORIDE SERPL-SCNC: 100 MMOL/L (ref 98–110)
CHOLEST SERPL-MCNC: 170 MG/DL
CHOLEST/HDLC SERPL: 3.8 (CALC)
CO2 SERPL-SCNC: 29 MMOL/L (ref 20–32)
CREAT SERPL-MCNC: 0.89 MG/DL (ref 0.6–1.35)
EOSINOPHIL # BLD AUTO: 403 CELLS/UL (ref 15–500)
EOSINOPHIL NFR BLD AUTO: 7.2 %
ERYTHROCYTE [DISTWIDTH] IN BLOOD BY AUTOMATED COUNT: 12.4 % (ref 11–15)
GFRSERPLBLD MDRD-ARVRAT: 112 ML/MIN/1.73M2
GLOBULIN SER CALC-MCNC: 2.8 G/DL (CALC) (ref 1.9–3.7)
GLUCOSE SERPL-MCNC: 94 MG/DL (ref 65–99)
HCT VFR BLD AUTO: 41.3 % (ref 38.5–50)
HDLC SERPL-MCNC: 45 MG/DL
HGB BLD-MCNC: 13.9 G/DL (ref 13.2–17.1)
LDLC SERPL CALC-MCNC: 87 MG/DL (CALC)
LYMPHOCYTES # BLD AUTO: 1585 CELLS/UL (ref 850–3900)
LYMPHOCYTES NFR BLD AUTO: 28.3 %
MCH RBC QN AUTO: 29.2 PG (ref 27–33)
MCHC RBC AUTO-ENTMCNC: 33.7 G/DL (ref 32–36)
MCV RBC AUTO: 86.8 FL (ref 80–100)
MONOCYTES # BLD AUTO: 370 CELLS/UL (ref 200–950)
MONOCYTES NFR BLD AUTO: 6.6 %
NEUTROPHILS # BLD AUTO: 3214 CELLS/UL (ref 1500–7800)
NEUTROPHILS NFR BLD AUTO: 57.4 %
NONHDLC SERPL-MCNC: 125 MG/DL (CALC)
PLATELET # BLD AUTO: 227 THOUSAND/UL (ref 140–400)
PMV BLD REES-ECKER: 12.5 FL (ref 7.5–12.5)
POTASSIUM SERPL-SCNC: 4.3 MMOL/L (ref 3.5–5.3)
PROT SERPL-MCNC: 7.4 G/DL (ref 6.1–8.1)
RBC # BLD AUTO: 4.76 MILLION/UL (ref 4.2–5.8)
SODIUM SERPL-SCNC: 138 MMOL/L (ref 135–146)
TRIGL SERPL-MCNC: 289 MG/DL
WBC # BLD AUTO: 5.6 THOUSAND/UL (ref 3.8–10.8)

## 2020-01-25 DIAGNOSIS — K21.9 GASTROESOPHAGEAL REFLUX DISEASE, ESOPHAGITIS PRESENCE NOT SPECIFIED: ICD-10-CM

## 2020-01-27 RX ORDER — OMEPRAZOLE 40 MG/1
CAPSULE, DELAYED RELEASE ORAL
Qty: 60 CAPSULE | Refills: 4 | Status: SHIPPED | OUTPATIENT
Start: 2020-01-27 | End: 2021-02-02

## 2020-02-04 ENCOUNTER — PATIENT MESSAGE (OUTPATIENT)
Dept: INTERNAL MEDICINE | Facility: CLINIC | Age: 35
End: 2020-02-04

## 2020-02-04 RX ORDER — LEVOCETIRIZINE DIHYDROCHLORIDE 5 MG/1
5 TABLET, FILM COATED ORAL DAILY
Qty: 30 TABLET | Refills: 9 | Status: SHIPPED | OUTPATIENT
Start: 2020-02-04 | End: 2020-06-24 | Stop reason: ALTCHOICE

## 2020-02-06 ENCOUNTER — PATIENT OUTREACH (OUTPATIENT)
Dept: ADMINISTRATIVE | Facility: OTHER | Age: 35
End: 2020-02-06

## 2020-02-11 ENCOUNTER — INITIAL CONSULT (OUTPATIENT)
Dept: DERMATOLOGY | Facility: CLINIC | Age: 35
End: 2020-02-11
Payer: OTHER GOVERNMENT

## 2020-02-11 DIAGNOSIS — L98.9 DERMATOSIS: Primary | ICD-10-CM

## 2020-02-11 PROCEDURE — 99999 PR PBB SHADOW E&M-EST. PATIENT-LVL II: CPT | Mod: PBBFAC,,, | Performed by: PHYSICIAN ASSISTANT

## 2020-02-11 PROCEDURE — 99999 PR PBB SHADOW E&M-EST. PATIENT-LVL II: ICD-10-PCS | Mod: PBBFAC,,, | Performed by: PHYSICIAN ASSISTANT

## 2020-02-11 PROCEDURE — 99202 OFFICE O/P NEW SF 15 MIN: CPT | Mod: S$GLB,,, | Performed by: PHYSICIAN ASSISTANT

## 2020-02-11 PROCEDURE — 99202 PR OFFICE/OUTPT VISIT, NEW, LEVL II, 15-29 MIN: ICD-10-PCS | Mod: S$GLB,,, | Performed by: PHYSICIAN ASSISTANT

## 2020-02-11 NOTE — LETTER
February 11, 2020      Juan Ley MD  [address]  [facility] [address]           [facility] - Dermatology  [address]  [facility] [address]  Phone: [phone]  Fax: [phone]          Patient: Manoj Gutierrez   MR Number: [id number]   YOB: 1985   Date of Visit: 2/11/2020       Dear Dr. Juan Ley:    Thank you for referring Manoj Gutierrez to me for evaluation. Attached you will find relevant portions of my assessment and plan of care.    If you have questions, please do not hesitate to call me. I look forward to following Manoj Gutierrez along with you.    Sincerely,    Mihaela Lindsay PA-C    Enclosure  CC:  No Recipients    If you would like to receive this communication electronically, please contact [email or web address] or [phone] to request more information on [employer] access.    For providers and/or their staff who would like to refer a patient to [facility], please contact us through our one-stop-shop provider referral line, [facility], at [phone].    If you feel you have received this communication in error or would no longer like to receive these types of communications, please e-mail [email or web address]

## 2020-02-11 NOTE — PROGRESS NOTES
Subjective:       Patient ID:  Manoj Gutierrez is a 34 y.o. male who presents for   Chief Complaint   Patient presents with    Rash     right lower leg, X2mos, dark, tac cream      Rash  - Initial  Affected locations: right upper leg  Duration: 2 months  Signs / symptoms: itching and dryness (darker than surrounding skin)  Aggravated by: scratching  Treatments tried: TAC crm bid x 1 month per PCP - helps with itching.  Improvement on treatment: mild    Pt showers bid with cool-lukewarm water and Dove soap. Moisturizes bid with Aveeno ltn.    Review of Systems   Constitutional: Negative for fever and chills.   Skin: Positive for itching (not currently) and rash.   Hematologic/Lymphatic: Does not bruise/bleed easily.        Objective:    Physical Exam   Constitutional: He appears well-developed and well-nourished. No distress.   Neurological: He is alert and oriented to person, place, and time. He is not disoriented.   Psychiatric: He has a normal mood and affect.   Skin:   Areas Examined (abnormalities noted in diagram):   RLE Inspected              Diagram Legend     Erythematous scaling macule/papule c/w actinic keratosis       Vascular papule c/w angioma      Pigmented verrucoid papule/plaque c/w seborrheic keratosis      Yellow umbilicated papule c/w sebaceous hyperplasia      Irregularly shaped tan macule c/w lentigo     1-2 mm smooth white papules consistent with Milia      Movable subcutaneous cyst with punctum c/w epidermal inclusion cyst      Subcutaneous movable cyst c/w pilar cyst      Firm pink to brown papule c/w dermatofibroma      Pedunculated fleshy papule(s) c/w skin tag(s)      Evenly pigmented macule c/w junctional nevus     Mildly variegated pigmented, slightly irregular-bordered macule c/w mildly atypical nevus      Flesh colored to evenly pigmented papule c/w intradermal nevus       Pink pearly papule/plaque c/w basal cell carcinoma      Erythematous hyperkeratotic cursted plaque c/w SCC       Surgical scar with no sign of skin cancer recurrence      Open and closed comedones      Inflammatory papules and pustules      Verrucoid papule consistent consistent with wart     Erythematous eczematous patches and plaques     Dystrophic onycholytic nail with subungual debris c/w onychomycosis     Umbilicated papule    Erythematous-base heme-crusted tan verrucoid plaque consistent with inflamed seborrheic keratosis     Erythematous Silvery Scaling Plaque c/w Psoriasis     See annotation      Assessment / Plan:      Dermatosis, nos - most c/w mild LSC at this time  Assured pt no infectious etiology is suspected.  No longer pruritic so advised pt to d/c TAC crm.    Discussed with patient the importance of not manipulating skin lesions. Trauma often exacerbates condition. Trimming nails is recommended to avoid puncturing skin.     Good skin care regimen discussed including limiting to one bath or shower/day, using lukewarm water with mild soap and moisturizing cream to skin 1 - 2x/day. Brochure was provided and reviewed with patient.    Spent 20 minutes in coordination of care and/or consultation with patient discussing diagnosis, treatment options, risks and benefits of each. All questions answered.         Follow up if symptoms worsen or fail to improve.

## 2020-02-11 NOTE — PATIENT INSTRUCTIONS
Discontinue triamcinolone cream.  Discussed with patient the importance of not manipulating skin lesions. Trauma often exacerbates condition. Trimming nails is recommended to avoid puncturing skin.       XEROSIS (DRY SKIN)        1. Definition    Xerosis is the term for dry skin.  We all have a natural oil coating over our skin produced by the skin oil glands.  If this oil is removed, the skin becomes dry which can lead to cracking, which can lead to inflammation.  Xerosis is usually a long-term problem that recurs often, especially in the winter.    2. Cause     Long hot baths or showers can remove our natural oil and lead to xerosis.  One should never take more than one bath or shower a day and for no longer than ten minutes.   Use of harsh soaps such as Zest, Dial, and Ivory can worsen and cause xerosis.   Cold winter weather worsens xerosis because the amount of moisture contained in cold air is much less than the amount of moisture in warm air.    3. Treatment     Treatment is intended to restore the natural oil to your skin.  Keep the skin lubricated.     Do not take more than one bath or shower a day.  Use lukewarm water, not hot.  Hot water dries out the skin.     Use a gentle moisturizing soap such as Cetaphil soap, Oil of Olay, Dove, Basis, Ivory moisture care, Restoraderm cleanser.     When toweling dry, dont rub.  Blot the skin so there is still some water left on the skin.  You should apply a moisturizing cream to all of the skin such as Cerave cream, Cetaphil cream, Restoraderm or Eucerin Original Formula cream.   Alpha hydroxyacid lotions, i.e., AmLactin, also work very well for preventing dry skin, but may burn when used on inflamed or reddened skin.     If you like to swim during the winter months, you should not use soap when getting out of the pool.  When you have finished swimming, rinse off the chlorine with cool to warm water.  If this will be the only shower of the day, then you may  use Cetaphil or another mild soap to cleanse your skin.  After the shower, apply a moisturizing cream to all of the skin as above.        [address] [place] [address]/ [phone] FAX/ [email or web address]

## 2020-03-06 DIAGNOSIS — I10 ESSENTIAL HYPERTENSION: ICD-10-CM

## 2020-03-06 RX ORDER — METOPROLOL TARTRATE 50 MG/1
TABLET ORAL
Qty: 180 TABLET | Refills: 2 | Status: SHIPPED | OUTPATIENT
Start: 2020-03-06 | End: 2020-07-14 | Stop reason: ALTCHOICE

## 2020-06-03 ENCOUNTER — PATIENT MESSAGE (OUTPATIENT)
Dept: INTERNAL MEDICINE | Facility: CLINIC | Age: 35
End: 2020-06-03

## 2020-06-03 DIAGNOSIS — E78.5 HYPERLIPIDEMIA, UNSPECIFIED HYPERLIPIDEMIA TYPE: Primary | ICD-10-CM

## 2020-06-24 ENCOUNTER — OFFICE VISIT (OUTPATIENT)
Dept: INTERNAL MEDICINE | Facility: CLINIC | Age: 35
End: 2020-06-24
Payer: OTHER GOVERNMENT

## 2020-06-24 DIAGNOSIS — Z91.09 ENVIRONMENTAL ALLERGIES: Primary | ICD-10-CM

## 2020-06-24 PROCEDURE — 99213 PR OFFICE/OUTPT VISIT, EST, LEVL III, 20-29 MIN: ICD-10-PCS | Mod: 95,,, | Performed by: FAMILY MEDICINE

## 2020-06-24 PROCEDURE — 99213 OFFICE O/P EST LOW 20 MIN: CPT | Mod: 95,,, | Performed by: FAMILY MEDICINE

## 2020-06-24 RX ORDER — CETIRIZINE HYDROCHLORIDE 10 MG/1
10 TABLET ORAL DAILY
COMMUNITY
End: 2021-02-15 | Stop reason: ALTCHOICE

## 2020-06-24 NOTE — PROGRESS NOTES
Subjective:      Patient ID: Manoj Gutierrez is a 34 y.o. male.    Chief Complaint: No chief complaint on file.      HPI:  Manoj Gutierrez is a 34 year old male with ADHD, back pain, environmental allergies, GERD, hyperlipidemia, and hypertension who presents for a virtual visit today complaining of seasonal allergies.    The patient location is: home in [place]  The chief complaint leading to consultation is: allergies    Visit type: audiovisual    Face to Face time with patient: 10 minutes  15 minutes of total time spent on the encounter, which includes face to face time and non-face to face time preparing to see the patient (eg, review of tests), Obtaining and/or reviewing separately obtained history, Documenting clinical information in the electronic or other health record, Independently interpreting results (not separately reported) and communicating results to the patient/family/caregiver, or Care coordination (not separately reported).     Each patient to whom he or she provides medical services by telemedicine is:  (1) informed of the relationship between the physician and patient and the respective role of any other health care provider with respect to management of the patient; and (2) notified that he or she may decline to receive medical services by telemedicine and may withdraw from such care at any time.    Prescribed Xyzal 5 mg by mouth daily and Dymista.  States his allergies have been worsening lately.  States he has added Zyrtec to his regimen.  States he has also switched from Dymista to Mucinex Sinus nasal spray.  States the Mucinex helps him breath a little more but still has frequent sneezing.        Past Medical History:   Diagnosis Date    Elevated transaminase level     Hyperlipidemia     Overweight (BMI 25.0-29.9)        No past surgical history on file.    No family history on file.    Social History     Socioeconomic History    Marital status: Single     Spouse name: Not on file     Number of children: Not on file    Years of education: Not on file    Highest education level: Not on file   Occupational History    Not on file   Social Needs    Financial resource strain: Somewhat hard    Food insecurity     Worry: Sometimes true     Inability: Sometimes true    Transportation needs     Medical: No     Non-medical: Yes   Tobacco Use    Smoking status: Never Smoker    Smokeless tobacco: Never Used   Substance and Sexual Activity    Alcohol use: Yes     Frequency: 2-3 times a week     Drinks per session: 1 or 2     Binge frequency: Less than monthly    Drug use: No    Sexual activity: Never   Lifestyle    Physical activity     Days per week: 5 days     Minutes per session: Not on file    Stress: Not at all   Relationships    Social connections     Talks on phone: More than three times a week     Gets together: Twice a week     Attends Dru[employer] service: Not on file     Active member of club or organization: Yes     Attends meetings of clubs or organizations: 1 to 4 times per year     Relationship status: Never    Other Topics Concern    Not on file   Social History Narrative    Not on file       Review of Systems   Constitutional: Positive for activity change. Negative for unexpected weight change.   HENT: Positive for rhinorrhea and sneezing. Negative for hearing loss and trouble swallowing.    Eyes: Positive for discharge. Negative for visual disturbance.   Respiratory: Negative for chest tightness and wheezing.    Cardiovascular: Negative for chest pain and palpitations.   Gastrointestinal: Negative for blood in stool, constipation, diarrhea and vomiting.   Endocrine: Negative for polydipsia and polyuria.   Genitourinary: Negative for difficulty urinating, hematuria and urgency.   Musculoskeletal: Negative for arthralgias, joint swelling and neck pain.   Allergic/Immunologic: Positive for environmental allergies.   Neurological: Negative for weakness and headaches.    Psychiatric/Behavioral: Positive for dysphoric mood. Negative for confusion.     Objective:   There were no vitals filed for this visit.    Physical Exam  Constitutional:       Appearance: Normal appearance.   HENT:      Head: Normocephalic and atraumatic.   Neurological:      Mental Status: He is alert.   Psychiatric:         Mood and Affect: Mood normal.         Thought Content: Thought content normal.         Judgment: Judgment normal.        Assessment:      1. Environmental allergies      Plan:   Diagnoses and all orders for this visit:    Environmental allergies  -     Ambulatory referral/consult to Allergy; Future  -     Discontinue Xyzal, continue Zyrtec.  Continue Mucinex nasal spray, add Flonase.  Discussed Singulair but considering side effect profile patient would prefer to avoid this at this time.  Referred to allergy for further evaluation and management.

## 2020-06-25 ENCOUNTER — OFFICE VISIT (OUTPATIENT)
Dept: ALLERGY | Facility: CLINIC | Age: 35
End: 2020-06-25
Payer: OTHER GOVERNMENT

## 2020-06-25 ENCOUNTER — PATIENT OUTREACH (OUTPATIENT)
Dept: ADMINISTRATIVE | Facility: OTHER | Age: 35
End: 2020-06-25

## 2020-06-25 ENCOUNTER — LAB VISIT (OUTPATIENT)
Dept: LAB | Facility: HOSPITAL | Age: 35
End: 2020-06-25
Payer: OTHER GOVERNMENT

## 2020-06-25 VITALS — WEIGHT: 166 LBS | BODY MASS INDEX: 25.16 KG/M2 | HEIGHT: 68 IN

## 2020-06-25 DIAGNOSIS — H10.423 SIMPLE CHRONIC CONJUNCTIVITIS OF BOTH EYES: ICD-10-CM

## 2020-06-25 DIAGNOSIS — J31.0 CHRONIC RHINITIS: Primary | ICD-10-CM

## 2020-06-25 DIAGNOSIS — Z91.09 ENVIRONMENTAL ALLERGIES: ICD-10-CM

## 2020-06-25 DIAGNOSIS — J31.0 CHRONIC RHINITIS: ICD-10-CM

## 2020-06-25 PROCEDURE — 99204 OFFICE O/P NEW MOD 45 MIN: CPT | Mod: S$GLB,,, | Performed by: ALLERGY & IMMUNOLOGY

## 2020-06-25 PROCEDURE — 36415 COLL VENOUS BLD VENIPUNCTURE: CPT | Mod: PO

## 2020-06-25 PROCEDURE — 99204 PR OFFICE/OUTPT VISIT, NEW, LEVL IV, 45-59 MIN: ICD-10-PCS | Mod: S$GLB,,, | Performed by: ALLERGY & IMMUNOLOGY

## 2020-06-25 PROCEDURE — 86003 ALLG SPEC IGE CRUDE XTRC EA: CPT

## 2020-06-25 PROCEDURE — 99999 PR PBB SHADOW E&M-EST. PATIENT-LVL III: CPT | Mod: PBBFAC,,, | Performed by: ALLERGY & IMMUNOLOGY

## 2020-06-25 PROCEDURE — 99999 PR PBB SHADOW E&M-EST. PATIENT-LVL III: ICD-10-PCS | Mod: PBBFAC,,, | Performed by: ALLERGY & IMMUNOLOGY

## 2020-06-25 PROCEDURE — 86003 ALLG SPEC IGE CRUDE XTRC EA: CPT | Mod: 59

## 2020-06-25 RX ORDER — MOMETASONE FUROATE 50 UG/1
2 SPRAY, METERED NASAL DAILY
Qty: 17 G | Refills: 12 | Status: SHIPPED | OUTPATIENT
Start: 2020-06-25 | End: 2021-06-25

## 2020-06-25 RX ORDER — EVOLOCUMAB 140 MG/ML
INJECTION, SOLUTION SUBCUTANEOUS
COMMUNITY
Start: 2020-06-19 | End: 2020-09-25

## 2020-06-30 LAB
A ALTERNATA IGE QN: <0.1 KU/L
A FUMIGATUS IGE QN: <0.1 KU/L
ALLERGEN CHAETOMIUM GLOBOSUM IGE: <0.1 KU/L
ALLERGEN WALNUT TREE IGE: <0.1 KU/L
ALLERGEN WHITE PINE TREE IGE: <0.1 KU/L
BAHIA GRASS IGE QN: 3.92 KU/L
BALD CYPRESS IGE QN: <0.1 KU/L
BERMUDA GRASS IGE QN: 1.13 KU/L
C HERBARUM IGE QN: <0.1 KU/L
C LUNATA IGE QN: <0.1 KU/L
CAT DANDER IGE QN: 1.41 KU/L
CHAETOMIUM GLOB. CLASS: NORMAL
COMMON RAGWEED IGE QN: <0.1 KU/L
COTTONWOOD IGE QN: <0.1 KU/L
D FARINAE IGE QN: 2.1 KU/L
D PTERONYSS IGE QN: 1.95 KU/L
DEPRECATED A ALTERNATA IGE RAST QL: NORMAL
DEPRECATED A FUMIGATUS IGE RAST QL: NORMAL
DEPRECATED BAHIA GRASS IGE RAST QL: ABNORMAL
DEPRECATED BALD CYPRESS IGE RAST QL: NORMAL
DEPRECATED BERMUDA GRASS IGE RAST QL: ABNORMAL
DEPRECATED C HERBARUM IGE RAST QL: NORMAL
DEPRECATED C LUNATA IGE RAST QL: NORMAL
DEPRECATED CAT DANDER IGE RAST QL: ABNORMAL
DEPRECATED COMMON RAGWEED IGE RAST QL: NORMAL
DEPRECATED COTTONWOOD IGE RAST QL: NORMAL
DEPRECATED D FARINAE IGE RAST QL: ABNORMAL
DEPRECATED D PTERONYSS IGE RAST QL: ABNORMAL
DEPRECATED DOG DANDER IGE RAST QL: ABNORMAL
DEPRECATED ELDER IGE RAST QL: NORMAL
DEPRECATED ENGL PLANTAIN IGE RAST QL: NORMAL
DEPRECATED HORSE DANDER IGE RAST QL: ABNORMAL
DEPRECATED JOHNSON GRASS IGE RAST QL: ABNORMAL
DEPRECATED LONDON PLANE IGE RAST QL: NORMAL
DEPRECATED MUGWORT IGE RAST QL: NORMAL
DEPRECATED OAT IGE RAST QL: NORMAL
DEPRECATED P NOTATUM IGE RAST QL: NORMAL
DEPRECATED PECAN/HICK TREE IGE RAST QL: NORMAL
DEPRECATED ROACH IGE RAST QL: NORMAL
DEPRECATED S ROSTRATA IGE RAST QL: NORMAL
DEPRECATED SALTWORT IGE RAST QL: NORMAL
DEPRECATED SILVER BIRCH IGE RAST QL: NORMAL
DEPRECATED TIMOTHY IGE RAST QL: ABNORMAL
DEPRECATED WEST RAGWEED IGE RAST QL: NORMAL
DEPRECATED WHITE OAK IGE RAST QL: NORMAL
DEPRECATED WILLOW IGE RAST QL: NORMAL
DOG DANDER IGE QN: 0.19 KU/L
ELDER IGE QN: <0.1 KU/L
ENGL PLANTAIN IGE QN: <0.1 KU/L
HORSE DANDER IGE QN: 0.45 KU/L
JOHNSON GRASS IGE QN: 2.28 KU/L
LONDON PLANE IGE QN: <0.1 KU/L
MUGWORT IGE QN: <0.1 KU/L
OAT IGE QN: <0.1 KU/L
P NOTATUM IGE QN: <0.1 KU/L
PECAN/HICK TREE IGE QN: <0.1 KU/L
ROACH IGE QN: <0.1 KU/L
S ROSTRATA IGE QN: <0.1 KU/L
SALTWORT IGE QN: <0.1 KU/L
SILVER BIRCH IGE QN: <0.1 KU/L
TIMOTHY IGE QN: 1.21 KU/L
WALNUT TREE CLASS: NORMAL
WEST RAGWEED IGE QN: <0.1 KU/L
WHITE OAK IGE QN: <0.1 KU/L
WHITE PINE CLASS: NORMAL
WILLOW IGE QN: <0.1 KU/L

## 2020-07-10 ENCOUNTER — TELEPHONE (OUTPATIENT)
Dept: INTERNAL MEDICINE | Facility: CLINIC | Age: 35
End: 2020-07-10

## 2020-07-10 ENCOUNTER — OFFICE VISIT (OUTPATIENT)
Dept: ALLERGY | Facility: CLINIC | Age: 35
End: 2020-07-10
Payer: OTHER GOVERNMENT

## 2020-07-10 ENCOUNTER — TELEPHONE (OUTPATIENT)
Dept: ALLERGY | Facility: CLINIC | Age: 35
End: 2020-07-10

## 2020-07-10 VITALS — TEMPERATURE: 97 F | BODY MASS INDEX: 24.92 KG/M2 | HEIGHT: 68 IN | WEIGHT: 164.44 LBS

## 2020-07-10 DIAGNOSIS — J30.9 CHRONIC ALLERGIC RHINITIS: Primary | ICD-10-CM

## 2020-07-10 DIAGNOSIS — H10.13 ALLERGIC CONJUNCTIVITIS, BILATERAL: ICD-10-CM

## 2020-07-10 DIAGNOSIS — Z51.6 ALLERGY DESENSITIZATION THERAPY: ICD-10-CM

## 2020-07-10 DIAGNOSIS — I10 ESSENTIAL HYPERTENSION: ICD-10-CM

## 2020-07-10 PROCEDURE — 99214 PR OFFICE/OUTPT VISIT, EST, LEVL IV, 30-39 MIN: ICD-10-PCS | Mod: S$GLB,,, | Performed by: ALLERGY & IMMUNOLOGY

## 2020-07-10 PROCEDURE — 99999 PR PBB SHADOW E&M-EST. PATIENT-LVL III: ICD-10-PCS | Mod: PBBFAC,,, | Performed by: ALLERGY & IMMUNOLOGY

## 2020-07-10 PROCEDURE — 99999 PR PBB SHADOW E&M-EST. PATIENT-LVL III: CPT | Mod: PBBFAC,,, | Performed by: ALLERGY & IMMUNOLOGY

## 2020-07-10 PROCEDURE — 99214 OFFICE O/P EST MOD 30 MIN: CPT | Mod: S$GLB,,, | Performed by: ALLERGY & IMMUNOLOGY

## 2020-07-10 NOTE — PROGRESS NOTES
Subjective:       Patient ID: Manoj Gutierrez is a 34 y.o. male.    Chief Complaint:  Follow-up (discuss allergy shots )      33 yo man presents for continued evaluation of allergic rhinitis and conjunctivitis. He was last seen 6/25/2020. He had immunocaps then with positives to cat, dog, horse, both dust mites and all 4 grasses. He definitely finds grass is a trigger. He is on Nasonex 1 SEN daily and zyrtec. These have helped, adding Nasonex has made him better but still with sneeze, runny nose, itchy nose, palate and eyes, congestion, PND. He is interested in IT. He does have HTN and is on metoprolol    Prior History taken 6/25/2020: new patient evaluation of allergies. He states had for years. He has sneeze, runny nose, itchy nose and eyes and sometimes upper palate, he has nasal congestion and PND as well. No chest tightness, SOB or wheeze. He is typically worse in sprig and better by now but this year still going on. Worse during day so about noon to night. Worse outside. Worse around cats and dust. No H/o asthma or eczema. He had test years ago and allergic to cat and grass but also oat. He eats oat and fine. No insect or latex allergy. He is on zyrtec and helps some. Changed from xyzal which was not working. He used Flonase and not much help. was on Dymista but stopped working. Using OTC Mucinex spray and ut helps but burns. No other medical issues. He did have sinuplasty and helped some.     Follow-up  Associated symptoms include congestion. Pertinent negatives include no abdominal pain, arthralgias, chest pain, chills, coughing, fatigue, fever, headaches, joint swelling, myalgias, nausea, rash, sore throat, vomiting or weakness.       Environmental History: see history section for home environment  Review of Systems   Constitutional: Negative for activity change, appetite change, chills, fatigue, fever and unexpected weight change.   HENT: Positive for congestion, postnasal drip, rhinorrhea, sinus pressure  and sneezing. Negative for ear discharge, ear pain, facial swelling, hearing loss, mouth sores, nosebleeds, sore throat, tinnitus, trouble swallowing and voice change.    Eyes: Positive for discharge, redness and itching. Negative for visual disturbance.   Respiratory: Negative for cough, chest tightness, shortness of breath and wheezing.    Cardiovascular: Negative for chest pain, palpitations and leg swelling.   Gastrointestinal: Negative for abdominal distention, abdominal pain, constipation, diarrhea, nausea and vomiting.   Genitourinary: Negative for difficulty urinating.   Musculoskeletal: Negative for arthralgias, back pain, joint swelling and myalgias.   Skin: Negative for color change, pallor and rash.   Neurological: Negative for dizziness, tremors, speech difficulty, weakness, light-headedness and headaches.   Hematological: Negative for adenopathy. Does not bruise/bleed easily.   Psychiatric/Behavioral: Negative for agitation, confusion, decreased concentration and sleep disturbance. The patient is not nervous/anxious.         Objective:      Physical Exam  Vitals signs and nursing note reviewed.   Constitutional:       General: He is not in acute distress.     Appearance: He is well-developed.   HENT:      Head: Normocephalic and atraumatic.      Right Ear: Hearing, tympanic membrane, ear canal and external ear normal.      Left Ear: Hearing, tympanic membrane, ear canal and external ear normal.      Nose: No septal deviation, mucosal edema (pink turbinates) or rhinorrhea.      Mouth/Throat:      Pharynx: No uvula swelling.   Eyes:      General:         Right eye: No discharge.         Left eye: No discharge.      Conjunctiva/sclera: Conjunctivae normal.   Neck:      Musculoskeletal: Normal range of motion.      Thyroid: No thyromegaly.   Cardiovascular:      Rate and Rhythm: Normal rate and regular rhythm.      Heart sounds: Normal heart sounds. No murmur.   Pulmonary:      Effort: Pulmonary effort is  normal. No respiratory distress.      Breath sounds: Normal breath sounds. No wheezing.   Abdominal:      General: There is no distension.      Palpations: Abdomen is soft.      Tenderness: There is no abdominal tenderness.   Musculoskeletal: Normal range of motion.   Lymphadenopathy:      Cervical: No cervical adenopathy.   Skin:     General: Skin is warm and dry.      Findings: No erythema or rash.   Neurological:      Mental Status: He is alert and oriented to person, place, and time.      Coordination: Coordination normal.   Psychiatric:         Behavior: Behavior normal.         Thought Content: Thought content normal.         Judgment: Judgment normal.         Laboratory:   none performed   Assessment:       1. Chronic allergic rhinitis    2. Allergic conjunctivitis, bilateral    3. Essential hypertension    4. Allergy desensitization therapy         Plan:       1. Animal avoidance discussed  2. Dust mite avoidance, measures discussed and handout provided.  3. continue Nasonex 2 SEN daily and continue zyrtec, he is worried about montelukast due to possible depression side effect as he has anxiety  4. Pt interested in IT advsied he will need to discuss changing blood pressure med with PCP first. If can change then Patient interested in allergy shots.  All risks and benefits discussed.  Protocol discussed in detail including need to remain in clinic for 30 minutes after injections.  All questions answered, handouts with details of allergy shots provided and informed consent signed and witnessed.  Patient advised to remain off beta blockers while on allergy shots and to notify injection clinic and MD of any new medications starts.

## 2020-07-10 NOTE — TELEPHONE ENCOUNTER
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 Beta blocker medications are a contraindication to being on allergy shots as they make epinephrine less effective. Is it possible that he could try a different class of medication for his HTN? If so can you reach out to him about changing? He is interested ins starting shots as soon as possible.   Thanks  Christine Erazo MD A/I

## 2020-07-10 NOTE — TELEPHONE ENCOUNTER
Please contact patient and help schedule medication management appt to discuss alternative to metoprolol for his hypertension.  Allergy shots require patients to not be on beta blockers.

## 2020-07-10 NOTE — TELEPHONE ENCOUNTER
----- Message from Gabby Gutierrez sent at 7/10/2020  1:39 PM CDT -----  Contact: patient [phone]  Patient is returning a phone call.  Who left a message for the patient: Anne Cobos patient know what this is regarding:  discussing medication with   Comments:

## 2020-07-14 ENCOUNTER — OFFICE VISIT (OUTPATIENT)
Dept: INTERNAL MEDICINE | Facility: CLINIC | Age: 35
End: 2020-07-14
Payer: OTHER GOVERNMENT

## 2020-07-14 VITALS
OXYGEN SATURATION: 98 % | HEIGHT: 68 IN | SYSTOLIC BLOOD PRESSURE: 134 MMHG | DIASTOLIC BLOOD PRESSURE: 96 MMHG | HEART RATE: 77 BPM | BODY MASS INDEX: 24.59 KG/M2 | WEIGHT: 162.25 LBS

## 2020-07-14 DIAGNOSIS — I10 ESSENTIAL HYPERTENSION: Primary | ICD-10-CM

## 2020-07-14 PROCEDURE — 99999 PR PBB SHADOW E&M-EST. PATIENT-LVL IV: ICD-10-PCS | Mod: PBBFAC,,, | Performed by: FAMILY MEDICINE

## 2020-07-14 PROCEDURE — 99213 OFFICE O/P EST LOW 20 MIN: CPT | Mod: S$GLB,,, | Performed by: FAMILY MEDICINE

## 2020-07-14 PROCEDURE — 99999 PR PBB SHADOW E&M-EST. PATIENT-LVL IV: CPT | Mod: PBBFAC,,, | Performed by: FAMILY MEDICINE

## 2020-07-14 PROCEDURE — 99213 PR OFFICE/OUTPT VISIT, EST, LEVL III, 20-29 MIN: ICD-10-PCS | Mod: S$GLB,,, | Performed by: FAMILY MEDICINE

## 2020-07-14 RX ORDER — LOSARTAN POTASSIUM 50 MG/1
50 TABLET ORAL DAILY
Qty: 90 TABLET | Refills: 3 | Status: SHIPPED | OUTPATIENT
Start: 2020-07-14 | End: 2020-09-25

## 2020-07-14 NOTE — PROGRESS NOTES
Subjective:      Patient ID: Manoj Gutierrez is a 34 y.o. male.    Chief Complaint: medication review (f/u ) and Follow-up      HPI:  Manoj Gutierrez is a 34 year old male with ADHD, back pain, environmental allergies, GERD, hyperlipidemia, and hypertension who presents to clinic today to discuss medication management.    Recently seen in allergy and they wanted to start allergy shots but recommended alternative to metoprolol for his HTN as it would interfere with the epinephrine in the shots.  Was prescribed metoprolol tartrate 50 mg by mouth twice daily prior to establishing care with me.  When he first saw me had been out of metoprolol for ~3 weeks with noted elevated blood pressure and normal pulse at that time.  Diastolic blood pressure above goal today.  Monitors home blood pressure readings      Past Medical History:   Diagnosis Date    Allergy     Elevated transaminase level     Hyperlipidemia     Overweight (BMI 25.0-29.9)        Past Surgical History:   Procedure Laterality Date    SINUS SURGERY         Family History   Problem Relation Age of Onset    Asthma Sister        Social History     Socioeconomic History    Marital status: Single     Spouse name: Not on file    Number of children: Not on file    Years of education: Not on file    Highest education level: Not on file   Occupational History    Not on file   Social Needs    Financial resource strain: Somewhat hard    Food insecurity     Worry: Sometimes true     Inability: Sometimes true    Transportation needs     Medical: No     Non-medical: Yes   Tobacco Use    Smoking status: Never Smoker    Smokeless tobacco: Never Used   Substance and Sexual Activity    Alcohol use: Yes     Frequency: 2-3 times a week     Drinks per session: 1 or 2     Binge frequency: Less than monthly    Drug use: No    Sexual activity: Never   Lifestyle    Physical activity     Days per week: 5 days     Minutes per session: Not on file    Stress: Not  "at all   Relationships    Social connections     Talks on phone: More than three times a week     Gets together: Twice a week     Attends Rastafarian service: Not on file     Active member of club or organization: Yes     Attends meetings of clubs or organizations: 1 to 4 times per year     Relationship status: Never    Other Topics Concern    Not on file   Social History Narrative    Not on file       Review of Systems   Constitutional: Positive for activity change. Negative for chills, fatigue, fever and unexpected weight change.   HENT: Negative for congestion, hearing loss, nosebleeds, rhinorrhea, sore throat and trouble swallowing.    Eyes: Negative for pain, discharge and visual disturbance.   Respiratory: Negative for cough, chest tightness, shortness of breath and wheezing.    Cardiovascular: Negative for chest pain and palpitations.   Gastrointestinal: Negative for abdominal distention, abdominal pain, blood in stool, constipation, diarrhea, nausea and vomiting.   Endocrine: Negative for polydipsia and polyuria.   Genitourinary: Negative for difficulty urinating, dysuria, frequency, hematuria and urgency.   Musculoskeletal: Negative for arthralgias, back pain, joint swelling, myalgias and neck pain.   Skin: Negative for color change and rash.   Neurological: Negative for dizziness, syncope, speech difficulty, weakness, numbness and headaches.   Psychiatric/Behavioral: Negative for agitation, behavioral problems, confusion and dysphoric mood. The patient is not nervous/anxious.      Objective:     Vitals:    07/14/20 1530 07/14/20 1549   BP: (!) 136/94 (!) 134/96   BP Location: Right arm Right arm   Patient Position: Sitting Sitting   BP Method: Medium (Manual) Medium (Manual)   Pulse: 77    Temp: Comment: pt refused    SpO2: 98%    Weight: 73.6 kg (162 lb 4.1 oz)    Height: 5' 7.52" (1.715 m)        Physical Exam  Vitals signs and nursing note reviewed.   Constitutional:       General: He is not in " acute distress.     Appearance: He is well-developed.   HENT:      Head: Normocephalic and atraumatic.      Right Ear: Hearing and external ear normal.      Left Ear: Hearing and external ear normal.      Nose: Nose normal. No rhinorrhea.   Eyes:      General: Lids are normal.         Right eye: No discharge.         Left eye: No discharge.      Conjunctiva/sclera: Conjunctivae normal.   Neck:      Musculoskeletal: Neck supple.      Trachea: Trachea normal. No tracheal deviation.   Cardiovascular:      Rate and Rhythm: Normal rate and regular rhythm.      Heart sounds: Normal heart sounds. No murmur. No friction rub. No gallop.    Pulmonary:      Effort: Pulmonary effort is normal. No respiratory distress.      Breath sounds: Normal breath sounds. No wheezing or rales.   Abdominal:      General: Bowel sounds are normal. There is no distension.      Palpations: Abdomen is soft.      Tenderness: There is no abdominal tenderness. There is no guarding or rebound.   Musculoskeletal:         General: No deformity.   Skin:     General: Skin is warm and dry.      Findings: No rash.   Neurological:      Mental Status: He is alert.      Motor: No abnormal muscle tone.   Psychiatric:         Speech: Speech normal.         Behavior: Behavior normal. Behavior is cooperative.         Thought Content: Thought content normal.         Judgment: Judgment normal.        Assessment:      1. Essential hypertension      Plan:   Manoj was seen today for medication review and follow-up.    Diagnoses and all orders for this visit:    Essential hypertension  -     Discontinue metoprolol.  Start losartan (COZAAR) 50 MG tablet; Take 1 tablet (50 mg total) by mouth once daily.  Counseled patient on potential adverse effects.  Counseled patient on the importance of a low sodium diet and daily aerobic exercise.  Instructed patient to monitor daily home BP values, keep a log, and message me in 1 week to report daily values after starting new  Rx.

## 2020-07-14 NOTE — PATIENT INSTRUCTIONS
Controlling High Blood Pressure  High blood pressure (hypertension) is often called the silent killer. This is because many people who have it dont know it. High blood pressure is defined as 140/90 mm Hg or higher. Know your blood pressure and remember to check it regularly. Doing so can save your life. Here are some things you can do to help control your blood pressure.    Choose heart-healthy foods  · Select low-salt, low-fat foods. Limit sodium intake to 2,400 mg per day or the amount suggested by your healthcare provider.  · Limit canned, dried, cured, packaged, and fast foods. These can contain a lot of salt.  · Eat 8 to 10 servings of fruits and vegetables every day.  · Choose lean meats, fish, or chicken.  · Eat whole-grain pasta, brown rice, and beans.  · Eat 2 to 3 servings of low-fat or fat-free dairy products.  · Ask your doctor about the DASH eating plan. This plan helps reduce blood pressure.  · When you go to a restaurant, ask that your meal be prepared with no added salt.  Maintain a healthy weight  · Ask your healthcare provider how many calories to eat a day. Then stick to that number.  · Ask your healthcare provider what weight range is healthiest for you. If you are overweight, a weight loss of only 3% to 5% of your body weight can help lower blood pressure. Generally, a good weight loss goal is to lose 10% of your body weight in a year.  · Limit snacks and sweets.  · Get regular exercise.  Get up and get active  · Choose activities you enjoy. Find ones you can do with friends or family. This includes bicycling, dancing, walking, and jogging.  · Park farther away from building entrances.  · Use stairs instead of the elevator.  · When you can, walk or bike instead of driving.  · Busy leaves, garden, or do household repairs.  · Be active at a moderate to vigorous level of physical activity for at least 40 minutes for a minimum of 3 to 4 days a week.   Manage stress  · Make time to relax and enjoy  life. Find time to laugh.  · Communicate your concerns with your loved ones and your healthcare provider.  · Visit with family and friends, and keep up with hobbies.  Limit alcohol and quit smoking  · Men should have no more than 2 drinks per day.  · Women should have no more than 1 drink per day.  · Talk with your healthcare provider about quitting smoking. Smoking significantly increases your risk for heart disease and stroke. Ask your healthcare provider about community smoking cessation programs and other options.  Medicines  If lifestyle changes arent enough, your healthcare provider may prescribe high blood pressure medicine. Take all medicines as prescribed. If you have any questions about your medicines, ask your healthcare provider before stopping or changing them.   Date Last Reviewed: 4/27/2016  © 3292-6104 [employer]. [address] [place] [address], [place] [address]. All rights reserved. This information is not intended as a substitute for professional medical care. Always follow your healthcare professional's instructions.

## 2020-07-31 ENCOUNTER — TELEPHONE (OUTPATIENT)
Dept: ALLERGY | Facility: CLINIC | Age: 35
End: 2020-07-31

## 2020-07-31 NOTE — TELEPHONE ENCOUNTER
Called pt, no answer, left msg on vm, we have allergy vaccines in [facility].  Please call or send a msg to schedule an appt.

## 2020-08-05 DIAGNOSIS — Z13.79 GENETIC SCREENING: ICD-10-CM

## 2020-08-07 ENCOUNTER — CLINICAL SUPPORT (OUTPATIENT)
Dept: INTERNAL MEDICINE | Facility: CLINIC | Age: 35
End: 2020-08-07
Payer: OTHER GOVERNMENT

## 2020-08-07 DIAGNOSIS — J30.9 CHRONIC ALLERGIC RHINITIS: ICD-10-CM

## 2020-08-07 PROCEDURE — 99999 PR PBB SHADOW E&M-EST. PATIENT-LVL I: CPT | Mod: PBBFAC,,,

## 2020-08-07 PROCEDURE — 95115 PR IMMUNOTHERAPY, ONE INJECTION: ICD-10-PCS | Mod: S$GLB,,, | Performed by: FAMILY MEDICINE

## 2020-08-07 PROCEDURE — 99499 UNLISTED E&M SERVICE: CPT | Mod: S$GLB,,, | Performed by: ALLERGY & IMMUNOLOGY

## 2020-08-07 PROCEDURE — 95115 IMMUNOTHERAPY ONE INJECTION: CPT | Mod: S$GLB,,, | Performed by: FAMILY MEDICINE

## 2020-08-07 PROCEDURE — 99499 NO LOS: ICD-10-PCS | Mod: S$GLB,,, | Performed by: ALLERGY & IMMUNOLOGY

## 2020-08-07 PROCEDURE — 99999 PR PBB SHADOW E&M-EST. PATIENT-LVL I: ICD-10-PCS | Mod: PBBFAC,,,

## 2020-08-12 ENCOUNTER — CLINICAL SUPPORT (OUTPATIENT)
Dept: INTERNAL MEDICINE | Facility: CLINIC | Age: 35
End: 2020-08-12
Payer: OTHER GOVERNMENT

## 2020-08-12 DIAGNOSIS — J30.9 CHRONIC ALLERGIC RHINITIS: ICD-10-CM

## 2020-08-12 PROCEDURE — 95115 IMMUNOTHERAPY ONE INJECTION: CPT | Mod: S$GLB,,, | Performed by: FAMILY MEDICINE

## 2020-08-12 PROCEDURE — 99999 PR PBB SHADOW E&M-EST. PATIENT-LVL I: ICD-10-PCS | Mod: PBBFAC,,,

## 2020-08-12 PROCEDURE — 99499 NO LOS: ICD-10-PCS | Mod: S$GLB,,, | Performed by: ALLERGY & IMMUNOLOGY

## 2020-08-12 PROCEDURE — 95115 PR IMMUNOTHERAPY, ONE INJECTION: ICD-10-PCS | Mod: S$GLB,,, | Performed by: FAMILY MEDICINE

## 2020-08-12 PROCEDURE — 99499 UNLISTED E&M SERVICE: CPT | Mod: S$GLB,,, | Performed by: ALLERGY & IMMUNOLOGY

## 2020-08-12 PROCEDURE — 99999 PR PBB SHADOW E&M-EST. PATIENT-LVL I: CPT | Mod: PBBFAC,,,

## 2020-08-20 ENCOUNTER — TELEPHONE (OUTPATIENT)
Dept: ALLERGY | Facility: CLINIC | Age: 35
End: 2020-08-20

## 2020-08-20 ENCOUNTER — PATIENT OUTREACH (OUTPATIENT)
Dept: ADMINISTRATIVE | Facility: OTHER | Age: 35
End: 2020-08-20

## 2020-08-20 NOTE — TELEPHONE ENCOUNTER
----- Message from Josseline Ovalle sent at 8/20/2020  3:02 PM CDT -----  Would like to consult with Karly regarding some questions about medication, and his appt tomorrow. Please give a call back at [phone].

## 2020-08-20 NOTE — TELEPHONE ENCOUNTER
Spoke with pt and he stated the nurse (Shannan) packaged the IT vials in a cooler and advised him to bring IT vials to [facility] on 8/21/2020 for administration. I advised pt it is not our practice to accept medications from a patient for administration. Advised pt I will call back after verifying this is correct practice policy. Pt stated understanding.     I spoke with Shannan and asked her about the SOP for not receiving medications transported by a patient. Shannan stated she hands the vials off to patients all the time and stated that there is no reason we shouldn't accept them. I advised her we do not receive IT vials in this manner that I am aware of. Shannan stated she will have Dr Erazo call Dr Noriega to further discuss.     Patient is scheduled to come in tomorrow with the vials. Please advise.

## 2020-08-20 NOTE — TELEPHONE ENCOUNTER
----- Message from Sally Noriega MD sent at 8/20/2020  3:53 PM CDT -----  Sure.  We will take them.  Sally  ----- Message -----  From: Christine Erazo MD  Sent: 8/20/2020   3:44 PM CDT  To: MD Dr. Hari Guillen  Hi my name is Christine Erazo and I am one of the allergists at [facility] in [facility]. This patient, Mr Manoj Gutierrez just started allergy shots with me. Unfortunately he is now working in [place] an his manager will not let him come here for his shots. My injection nurse, Shannan, reached out to your clinic about continuing his shots and were told yes. Shannan package his serum correctly and gave to him to bring over since we did not think we could get a  from [place] to [place]. He brought them straight to your clinic and Em refused to accept them from him. I can understand wanting to make sure serum was handled properly; we have always followed that but since it came directly from [facility] to you can you please accept his vials and continue his shots.  Thanks you  Christine Erazo MD

## 2020-08-20 NOTE — PROGRESS NOTES
Subjective:       Patient ID: Manoj Gutierrez is a 34 y.o. male.    Initial Visit with out clinic (not new to [facility])    Chief Complaint:  Allergies      HPI: 34 year old male, previous patient of Dr. Gramajo (Allergist in [place]). He was started on allergy immunotherapy for inhalant allergies in July of 2020, but due to a job transfer, he will be receiving immunotherapy from our office. He has lived in [place] for 18 years. He suffers from post nasal drip, itchy and watery eyes, and a runny nose. Xyzal did not alleviate his symptoms in the past. He currently takes Zyrtec and Nasonex, which alleviate his symptoms. He denies chest pain, shortness of breath or wheeze. He denies a history of asthma or atopic dermatitis.        Past Medical History:   Diagnosis Date    Allergy     Elevated transaminase level     Hyperlipidemia     Overweight (BMI 25.0-29.9)      Family History   Problem Relation Age of Onset    Asthma Sister      Current Outpatient Medications on File Prior to Visit   Medication Sig Dispense Refill    cetirizine (ZYRTEC) 10 MG tablet Take 10 mg by mouth once daily.      losartan (COZAAR) 50 MG tablet Take 1 tablet (50 mg total) by mouth once daily. 90 tablet 3    mometasone (NASONEX) 50 mcg/actuation nasal spray 2 sprays by Nasal route once daily. 17 g 12    omeprazole (PRILOSEC) 40 MG capsule TAKE 1 CAPSULE BY MOUTH EVERY DAY 60 capsule 4    REPATHA SURECLICK 140 mg/mL PnIj       alirocumab (PRALUENT PEN) 75 mg/mL PnIj Inject 1 mL (75 mg total) into the skin every 14 (fourteen) days. (Patient not taking: Reported on 6/25/2020) 2 mL 11    triamcinolone acetonide 0.1% (KENALOG) 0.1 % cream Apply topically 2 (two) times daily. (Patient not taking: Reported on 8/21/2020) 45 g 2     No current facility-administered medications on file prior to visit.      Review of patient's allergies indicates:   Allergen Reactions    Cat/feline products     Grass pollen-june grass standard Other (See  Comments)       Environmental History: Dog and 3 fish. Not a smoker  Review of Systems   Constitutional: Negative for chills and fever.   HENT: Positive for postnasal drip and sneezing. Negative for congestion, rhinorrhea, sinus pressure and sinus pain.    Eyes: Negative for discharge and itching.   Respiratory: Negative for chest tightness, shortness of breath and wheezing.    Cardiovascular: Negative for chest pain and leg swelling.   Gastrointestinal: Negative for diarrhea and vomiting.        GERD   Endocrine: Negative for cold intolerance and heat intolerance.   Musculoskeletal: Negative for gait problem and joint swelling.   Skin: Positive for rash. Negative for wound.   Allergic/Immunologic: Positive for environmental allergies. Negative for food allergies.   Neurological: Negative for facial asymmetry and speech difficulty.   Hematological: Negative for adenopathy. Does not bruise/bleed easily.   Psychiatric/Behavioral: Negative for agitation and suicidal ideas.        Objective:    Physical Exam  Constitutional:       General: He is not in acute distress.     Appearance: Normal appearance. He is not ill-appearing, toxic-appearing or diaphoretic.   HENT:      Head: Normocephalic and atraumatic.      Right Ear: Tympanic membrane, ear canal and external ear normal. There is no impacted cerumen.      Left Ear: Tympanic membrane, ear canal and external ear normal. There is no impacted cerumen.      Nose: No congestion or rhinorrhea.      Mouth/Throat:      Pharynx: Oropharynx is clear. No oropharyngeal exudate or posterior oropharyngeal erythema.   Eyes:      General: No scleral icterus.        Right eye: No discharge.         Left eye: No discharge.      Pupils: Pupils are equal, round, and reactive to light.   Neck:      Musculoskeletal: Normal range of motion and neck supple. No neck rigidity or muscular tenderness.      Thyroid: No thyromegaly.   Cardiovascular:      Rate and Rhythm: Normal rate and regular  rhythm.      Heart sounds: Normal heart sounds. No murmur. No friction rub. No gallop.    Pulmonary:      Effort: Pulmonary effort is normal. No respiratory distress.      Breath sounds: Normal breath sounds. No stridor. No wheezing, rhonchi or rales.   Abdominal:      General: Bowel sounds are normal. There is no distension.      Palpations: Abdomen is soft. There is no mass.      Tenderness: There is no abdominal tenderness. There is no guarding.   Musculoskeletal: Normal range of motion.      Right lower leg: No edema.      Left lower leg: No edema.   Lymphadenopathy:      Cervical: No cervical adenopathy.   Skin:     General: Skin is warm and dry.      Coloration: Skin is not jaundiced.      Findings: No bruising or rash.   Neurological:      Mental Status: He is alert and oriented to person, place, and time.      Gait: Gait normal.   Psychiatric:         Mood and Affect: Mood normal.         Behavior: Behavior normal.         Thought Content: Thought content normal.         Judgment: Judgment normal.           Assessment:       1. Seasonal allergic rhinitis due to pollen    2. Allergic rhinitis due to animal (cat) (dog) hair and dander    3. Allergic rhinitis due to American house dust mite         Plan:       Transferring care from [facility].  Allergy avoidance measures.  Will continue allergy immunotherapy.  Silver (1:10,000) Grass, Dust mite, Horse, cat, Dog - 0.3ml given today.  Continue Zyrtec and Nasonex.  Epipen 0.3mg 2 pack ordered today. He was advised that he must bring the two pack with him when he comes for his injections.  Seasonal allergic rhinitis due to pollen  -     EPINEPHrine (EPIPEN) 0.3 mg/0.3 mL AtIn; Inject 0.3 mLs (0.3 mg total) into the muscle once. for 1 dose  Dispense: 2 Device; Refill: 3    Allergic rhinitis due to animal (cat) (dog) hair and dander  -     EPINEPHrine (EPIPEN) 0.3 mg/0.3 mL AtIn; Inject 0.3 mLs (0.3 mg total) into the muscle once. for 1 dose  Dispense:  2 Device; Refill: 3    Allergic rhinitis due to American house dust mite  -     EPINEPHrine (EPIPEN) 0.3 mg/0.3 mL AtIn; Inject 0.3 mLs (0.3 mg total) into the muscle once. for 1 dose  Dispense: 2 Device; Refill: 3    RTC 1 year for follow up.    DOMINGA DRISCOLL

## 2020-08-20 NOTE — TELEPHONE ENCOUNTER
Spoke with pt and advised it has been cleared for him to bring the vials to the appt on 8/21/2020 @ 7 am.     Pt stated understanding.

## 2020-08-21 ENCOUNTER — TELEPHONE (OUTPATIENT)
Dept: INTERNAL MEDICINE | Facility: CLINIC | Age: 35
End: 2020-08-21

## 2020-08-21 ENCOUNTER — CLINICAL SUPPORT (OUTPATIENT)
Dept: ALLERGY | Facility: CLINIC | Age: 35
End: 2020-08-21
Payer: OTHER GOVERNMENT

## 2020-08-21 ENCOUNTER — OFFICE VISIT (OUTPATIENT)
Dept: ALLERGY | Facility: CLINIC | Age: 35
End: 2020-08-21
Payer: OTHER GOVERNMENT

## 2020-08-21 VITALS
BODY MASS INDEX: 24.02 KG/M2 | DIASTOLIC BLOOD PRESSURE: 87 MMHG | TEMPERATURE: 98 F | SYSTOLIC BLOOD PRESSURE: 125 MMHG | HEIGHT: 68 IN | HEART RATE: 75 BPM | WEIGHT: 158.5 LBS

## 2020-08-21 DIAGNOSIS — J30.81 ALLERGIC RHINITIS DUE TO CATS: ICD-10-CM

## 2020-08-21 DIAGNOSIS — J30.89 ALLERGIC RHINITIS DUE TO DERMATOPHAGOIDES PTERONYSSINUS: ICD-10-CM

## 2020-08-21 DIAGNOSIS — J30.89 ALLERGIC RHINITIS DUE TO AMERICAN HOUSE DUST MITE: ICD-10-CM

## 2020-08-21 DIAGNOSIS — J30.1 SEASONAL ALLERGIC RHINITIS DUE TO POLLEN: Primary | ICD-10-CM

## 2020-08-21 DIAGNOSIS — L23.7 ALLERGIC CONTACT DERMATITIS DUE TO POLLEN: ICD-10-CM

## 2020-08-21 DIAGNOSIS — J30.89 ALLERGIC RHINITIS DUE TO DERMATOPHAGOIDES FARINAE: ICD-10-CM

## 2020-08-21 DIAGNOSIS — J30.81 ALLERGIC RHINITIS DUE TO ANIMAL (CAT) (DOG) HAIR AND DANDER: ICD-10-CM

## 2020-08-21 PROCEDURE — 99999 PR PBB SHADOW E&M-EST. PATIENT-LVL III: ICD-10-PCS | Mod: PBBFAC,,, | Performed by: ALLERGY & IMMUNOLOGY

## 2020-08-21 PROCEDURE — 95115 IMMUNOTHERAPY ONE INJECTION: CPT | Mod: S$GLB,,, | Performed by: ALLERGY & IMMUNOLOGY

## 2020-08-21 PROCEDURE — 99499 NO LOS: ICD-10-PCS | Mod: S$GLB,,, | Performed by: ALLERGY & IMMUNOLOGY

## 2020-08-21 PROCEDURE — 99214 PR OFFICE/OUTPT VISIT, EST, LEVL IV, 30-39 MIN: ICD-10-PCS | Mod: S$GLB,,, | Performed by: ALLERGY & IMMUNOLOGY

## 2020-08-21 PROCEDURE — 99214 OFFICE O/P EST MOD 30 MIN: CPT | Mod: S$GLB,,, | Performed by: ALLERGY & IMMUNOLOGY

## 2020-08-21 PROCEDURE — 95115 PR IMMUNOTHERAPY, ONE INJECTION: ICD-10-PCS | Mod: S$GLB,,, | Performed by: ALLERGY & IMMUNOLOGY

## 2020-08-21 PROCEDURE — 99499 UNLISTED E&M SERVICE: CPT | Mod: S$GLB,,, | Performed by: ALLERGY & IMMUNOLOGY

## 2020-08-21 PROCEDURE — 99999 PR PBB SHADOW E&M-EST. PATIENT-LVL III: CPT | Mod: PBBFAC,,, | Performed by: ALLERGY & IMMUNOLOGY

## 2020-08-21 RX ORDER — EPINEPHRINE 0.3 MG/.3ML
1 INJECTION SUBCUTANEOUS ONCE
Qty: 2 DEVICE | Refills: 3 | Status: SHIPPED | OUTPATIENT
Start: 2020-08-21 | End: 2023-06-27

## 2020-08-21 NOTE — TELEPHONE ENCOUNTER
Please leave vaccination card for Pneumovax for patient to  and notify him once complete.  Explanation sent via portal.

## 2020-08-25 ENCOUNTER — CLINICAL SUPPORT (OUTPATIENT)
Dept: ALLERGY | Facility: CLINIC | Age: 35
End: 2020-08-25
Payer: OTHER GOVERNMENT

## 2020-08-25 DIAGNOSIS — J30.89 ALLERGIC RHINITIS DUE TO DERMATOPHAGOIDES PTERONYSSINUS: ICD-10-CM

## 2020-08-25 DIAGNOSIS — J30.89 ALLERGIC RHINITIS DUE TO DERMATOPHAGOIDES FARINAE: ICD-10-CM

## 2020-08-25 DIAGNOSIS — J30.1 SEASONAL ALLERGIC RHINITIS DUE TO POLLEN: ICD-10-CM

## 2020-08-25 DIAGNOSIS — J30.81 ALLERGIC TO CATS: ICD-10-CM

## 2020-08-25 DIAGNOSIS — J30.81 ALLERGIC TO DOGS: ICD-10-CM

## 2020-08-25 PROCEDURE — 99499 UNLISTED E&M SERVICE: CPT | Mod: S$GLB,,, | Performed by: ALLERGY & IMMUNOLOGY

## 2020-08-25 PROCEDURE — 95115 IMMUNOTHERAPY ONE INJECTION: CPT | Mod: S$GLB,,, | Performed by: ALLERGY & IMMUNOLOGY

## 2020-08-25 PROCEDURE — 95115 PR IMMUNOTHERAPY, ONE INJECTION: ICD-10-PCS | Mod: S$GLB,,, | Performed by: ALLERGY & IMMUNOLOGY

## 2020-08-25 PROCEDURE — 99999 PR PBB SHADOW E&M-EST. PATIENT-LVL I: ICD-10-PCS | Mod: PBBFAC,,,

## 2020-08-25 PROCEDURE — 99999 PR PBB SHADOW E&M-EST. PATIENT-LVL I: CPT | Mod: PBBFAC,,,

## 2020-08-25 PROCEDURE — 99499 NO LOS: ICD-10-PCS | Mod: S$GLB,,, | Performed by: ALLERGY & IMMUNOLOGY

## 2020-08-28 ENCOUNTER — CLINICAL SUPPORT (OUTPATIENT)
Dept: ALLERGY | Facility: CLINIC | Age: 35
End: 2020-08-28
Payer: OTHER GOVERNMENT

## 2020-08-28 DIAGNOSIS — J30.81 ALLERGIC TO CATS: ICD-10-CM

## 2020-08-28 DIAGNOSIS — J30.81 ALLERGIC TO DOGS: ICD-10-CM

## 2020-08-28 DIAGNOSIS — J30.89 ALLERGIC RHINITIS DUE TO DERMATOPHAGOIDES FARINAE: ICD-10-CM

## 2020-08-28 DIAGNOSIS — J30.89 ALLERGIC RHINITIS DUE TO DERMATOPHAGOIDES PTERONYSSINUS: ICD-10-CM

## 2020-08-28 PROCEDURE — 95115 PR IMMUNOTHERAPY, ONE INJECTION: ICD-10-PCS | Mod: S$GLB,,, | Performed by: ALLERGY & IMMUNOLOGY

## 2020-08-28 PROCEDURE — 99499 NO LOS: ICD-10-PCS | Mod: S$GLB,,, | Performed by: ALLERGY & IMMUNOLOGY

## 2020-08-28 PROCEDURE — 99499 UNLISTED E&M SERVICE: CPT | Mod: S$GLB,,, | Performed by: ALLERGY & IMMUNOLOGY

## 2020-08-28 PROCEDURE — 95115 IMMUNOTHERAPY ONE INJECTION: CPT | Mod: S$GLB,,, | Performed by: ALLERGY & IMMUNOLOGY

## 2020-09-04 ENCOUNTER — CLINICAL SUPPORT (OUTPATIENT)
Dept: ALLERGY | Facility: CLINIC | Age: 35
End: 2020-09-04
Payer: OTHER GOVERNMENT

## 2020-09-04 DIAGNOSIS — J30.89 ALLERGIC RHINITIS DUE TO DERMATOPHAGOIDES FARINAE: ICD-10-CM

## 2020-09-04 DIAGNOSIS — J30.81 ALLERGIC TO DOGS: ICD-10-CM

## 2020-09-04 DIAGNOSIS — J30.1 SEASONAL ALLERGIC RHINITIS DUE TO POLLEN: ICD-10-CM

## 2020-09-04 DIAGNOSIS — J30.81 ALLERGIC RHINITIS DUE TO CATS: ICD-10-CM

## 2020-09-04 DIAGNOSIS — J30.89 ALLERGIC RHINITIS DUE TO DERMATOPHAGOIDES PTERONYSSINUS: ICD-10-CM

## 2020-09-04 PROCEDURE — 95117 IMMUNOTHERAPY INJECTIONS: CPT | Mod: S$GLB,,, | Performed by: ALLERGY & IMMUNOLOGY

## 2020-09-04 PROCEDURE — 99999 PR PBB SHADOW E&M-EST. PATIENT-LVL II: ICD-10-PCS | Mod: PBBFAC,,,

## 2020-09-04 PROCEDURE — 99499 UNLISTED E&M SERVICE: CPT | Mod: S$GLB,,, | Performed by: ALLERGY & IMMUNOLOGY

## 2020-09-04 PROCEDURE — 99499 NO LOS: ICD-10-PCS | Mod: S$GLB,,, | Performed by: ALLERGY & IMMUNOLOGY

## 2020-09-04 PROCEDURE — 99999 PR PBB SHADOW E&M-EST. PATIENT-LVL II: CPT | Mod: PBBFAC,,,

## 2020-09-04 PROCEDURE — 95117 PR IMMU2THERAPY, 2+ INJECTIONS: ICD-10-PCS | Mod: S$GLB,,, | Performed by: ALLERGY & IMMUNOLOGY

## 2020-09-08 ENCOUNTER — CLINICAL SUPPORT (OUTPATIENT)
Dept: ALLERGY | Facility: CLINIC | Age: 35
End: 2020-09-08
Payer: OTHER GOVERNMENT

## 2020-09-08 ENCOUNTER — IMMUNIZATION (OUTPATIENT)
Dept: PHARMACY | Facility: CLINIC | Age: 35
End: 2020-09-08
Payer: OTHER GOVERNMENT

## 2020-09-08 DIAGNOSIS — J30.81 ALLERGIC RHINITIS DUE TO CATS: Primary | ICD-10-CM

## 2020-09-08 PROCEDURE — 95115 PR IMMUNOTHERAPY, ONE INJECTION: ICD-10-PCS | Mod: S$GLB,,, | Performed by: ALLERGY & IMMUNOLOGY

## 2020-09-08 PROCEDURE — 99499 UNLISTED E&M SERVICE: CPT | Mod: S$GLB,,, | Performed by: ALLERGY & IMMUNOLOGY

## 2020-09-08 PROCEDURE — 99999 PR PBB SHADOW E&M-EST. PATIENT-LVL II: CPT | Mod: PBBFAC,,,

## 2020-09-08 PROCEDURE — 99999 PR PBB SHADOW E&M-EST. PATIENT-LVL II: ICD-10-PCS | Mod: PBBFAC,,,

## 2020-09-08 PROCEDURE — 95115 IMMUNOTHERAPY ONE INJECTION: CPT | Mod: S$GLB,,, | Performed by: ALLERGY & IMMUNOLOGY

## 2020-09-08 PROCEDURE — 99499 NO LOS: ICD-10-PCS | Mod: S$GLB,,, | Performed by: ALLERGY & IMMUNOLOGY

## 2020-09-08 NOTE — PROGRESS NOTES
Patient stated he received a pneumonia vaccine and a Flu shot in his left arm This past Saturday 9/5/20 in [place].That day he developed some scattered redness to that arm which subsided on Sunday. The area is clear today.    See review flowsheets

## 2020-09-14 ENCOUNTER — CLINICAL SUPPORT (OUTPATIENT)
Dept: ALLERGY | Facility: CLINIC | Age: 35
End: 2020-09-14
Payer: OTHER GOVERNMENT

## 2020-09-14 DIAGNOSIS — J30.81 ALLERGIC RHINITIS DUE TO ANIMAL (CAT) (DOG) HAIR AND DANDER: ICD-10-CM

## 2020-09-14 DIAGNOSIS — Z91.09 ENVIRONMENTAL ALLERGIES: ICD-10-CM

## 2020-09-14 PROCEDURE — 95115 PR IMMUNOTHERAPY, ONE INJECTION: ICD-10-PCS | Mod: S$GLB,,, | Performed by: ALLERGY & IMMUNOLOGY

## 2020-09-14 PROCEDURE — 99499 NO LOS: ICD-10-PCS | Mod: S$GLB,,, | Performed by: ALLERGY & IMMUNOLOGY

## 2020-09-14 PROCEDURE — 99499 UNLISTED E&M SERVICE: CPT | Mod: S$GLB,,, | Performed by: ALLERGY & IMMUNOLOGY

## 2020-09-14 PROCEDURE — 95115 IMMUNOTHERAPY ONE INJECTION: CPT | Mod: S$GLB,,, | Performed by: ALLERGY & IMMUNOLOGY

## 2020-09-14 NOTE — PROGRESS NOTES
See Flowsheet for immunotherapy administration. Patient waited in clinic 30 min for observation. Pt had epi pen on hand.

## 2020-09-16 ENCOUNTER — PATIENT MESSAGE (OUTPATIENT)
Dept: INTERNAL MEDICINE | Facility: CLINIC | Age: 35
End: 2020-09-16

## 2020-09-22 ENCOUNTER — CLINICAL SUPPORT (OUTPATIENT)
Dept: ALLERGY | Facility: CLINIC | Age: 35
End: 2020-09-22
Payer: OTHER GOVERNMENT

## 2020-09-22 DIAGNOSIS — J30.81 ALLERGIC RHINITIS DUE TO CATS: ICD-10-CM

## 2020-09-22 DIAGNOSIS — J30.1 SEASONAL ALLERGIC RHINITIS DUE TO POLLEN: ICD-10-CM

## 2020-09-22 DIAGNOSIS — J30.81 ALLERGIC TO DOGS: ICD-10-CM

## 2020-09-22 DIAGNOSIS — J30.89 ALLERGIC RHINITIS DUE TO DERMATOPHAGOIDES FARINAE: ICD-10-CM

## 2020-09-22 DIAGNOSIS — J30.89 ALLERGIC RHINITIS DUE TO DERMATOPHAGOIDES PTERONYSSINUS: ICD-10-CM

## 2020-09-22 PROCEDURE — 99999 PR PBB SHADOW E&M-EST. PATIENT-LVL II: CPT | Mod: PBBFAC,,,

## 2020-09-22 PROCEDURE — 99999 PR PBB SHADOW E&M-EST. PATIENT-LVL II: ICD-10-PCS | Mod: PBBFAC,,,

## 2020-09-22 PROCEDURE — 95115 PR IMMUNOTHERAPY, ONE INJECTION: ICD-10-PCS | Mod: S$GLB,,, | Performed by: ALLERGY & IMMUNOLOGY

## 2020-09-22 PROCEDURE — 99499 NO LOS: ICD-10-PCS | Mod: S$GLB,,, | Performed by: ALLERGY & IMMUNOLOGY

## 2020-09-22 PROCEDURE — 95115 IMMUNOTHERAPY ONE INJECTION: CPT | Mod: S$GLB,,, | Performed by: ALLERGY & IMMUNOLOGY

## 2020-09-22 PROCEDURE — 99499 UNLISTED E&M SERVICE: CPT | Mod: S$GLB,,, | Performed by: ALLERGY & IMMUNOLOGY

## 2020-09-25 ENCOUNTER — OFFICE VISIT (OUTPATIENT)
Dept: INTERNAL MEDICINE | Facility: CLINIC | Age: 35
End: 2020-09-25
Payer: OTHER GOVERNMENT

## 2020-09-25 ENCOUNTER — CLINICAL SUPPORT (OUTPATIENT)
Dept: ALLERGY | Facility: CLINIC | Age: 35
End: 2020-09-25
Payer: OTHER GOVERNMENT

## 2020-09-25 ENCOUNTER — HOSPITAL ENCOUNTER (OUTPATIENT)
Dept: CARDIOLOGY | Facility: CLINIC | Age: 35
Discharge: HOME | End: 2020-09-25
Payer: OTHER GOVERNMENT

## 2020-09-25 VITALS
WEIGHT: 156.31 LBS | SYSTOLIC BLOOD PRESSURE: 130 MMHG | HEIGHT: 66 IN | OXYGEN SATURATION: 99 % | DIASTOLIC BLOOD PRESSURE: 93 MMHG | HEART RATE: 96 BPM | BODY MASS INDEX: 25.12 KG/M2 | TEMPERATURE: 98 F

## 2020-09-25 DIAGNOSIS — R00.2 PALPITATIONS: ICD-10-CM

## 2020-09-25 DIAGNOSIS — I10 ESSENTIAL HYPERTENSION: ICD-10-CM

## 2020-09-25 DIAGNOSIS — R55 PRE-SYNCOPE: ICD-10-CM

## 2020-09-25 DIAGNOSIS — R55 PRE-SYNCOPE: Primary | ICD-10-CM

## 2020-09-25 DIAGNOSIS — Z91.09 ENVIRONMENTAL ALLERGIES: ICD-10-CM

## 2020-09-25 PROCEDURE — 99213 OFFICE O/P EST LOW 20 MIN: CPT | Mod: S$GLB,,, | Performed by: FAMILY MEDICINE

## 2020-09-25 PROCEDURE — 95115 PR IMMUNOTHERAPY, ONE INJECTION: ICD-10-PCS | Mod: S$GLB,,, | Performed by: ALLERGY & IMMUNOLOGY

## 2020-09-25 PROCEDURE — 93005 ELECTROCARDIOGRAM TRACING: CPT | Mod: S$GLB,,, | Performed by: FAMILY MEDICINE

## 2020-09-25 PROCEDURE — 99999 PR PBB SHADOW E&M-EST. PATIENT-LVL I: CPT | Mod: PBBFAC,,,

## 2020-09-25 PROCEDURE — 93005 EKG 12-LEAD: ICD-10-PCS | Mod: S$GLB,,, | Performed by: FAMILY MEDICINE

## 2020-09-25 PROCEDURE — 95115 IMMUNOTHERAPY ONE INJECTION: CPT | Mod: S$GLB,,, | Performed by: ALLERGY & IMMUNOLOGY

## 2020-09-25 PROCEDURE — 99213 PR OFFICE/OUTPT VISIT, EST, LEVL III, 20-29 MIN: ICD-10-PCS | Mod: S$GLB,,, | Performed by: FAMILY MEDICINE

## 2020-09-25 PROCEDURE — 93010 EKG 12-LEAD: ICD-10-PCS | Mod: S$GLB,,, | Performed by: INTERNAL MEDICINE

## 2020-09-25 PROCEDURE — 99999 PR PBB SHADOW E&M-EST. PATIENT-LVL V: CPT | Mod: PBBFAC,,, | Performed by: FAMILY MEDICINE

## 2020-09-25 PROCEDURE — 99999 PR PBB SHADOW E&M-EST. PATIENT-LVL V: ICD-10-PCS | Mod: PBBFAC,,, | Performed by: FAMILY MEDICINE

## 2020-09-25 PROCEDURE — 93010 ELECTROCARDIOGRAM REPORT: CPT | Mod: S$GLB,,, | Performed by: INTERNAL MEDICINE

## 2020-09-25 PROCEDURE — 99499 UNLISTED E&M SERVICE: CPT | Mod: S$GLB,,, | Performed by: ALLERGY & IMMUNOLOGY

## 2020-09-25 PROCEDURE — 99999 PR PBB SHADOW E&M-EST. PATIENT-LVL I: ICD-10-PCS | Mod: PBBFAC,,,

## 2020-09-25 PROCEDURE — 99499 NO LOS: ICD-10-PCS | Mod: S$GLB,,, | Performed by: ALLERGY & IMMUNOLOGY

## 2020-09-25 RX ORDER — LOSARTAN POTASSIUM 50 MG/1
25 TABLET ORAL DAILY
Qty: 45 TABLET | Refills: 3
Start: 2020-09-25 | End: 2021-10-11

## 2020-09-25 RX ORDER — LEVOCETIRIZINE DIHYDROCHLORIDE 5 MG/1
TABLET, FILM COATED ORAL
COMMUNITY
Start: 2020-08-18 | End: 2021-02-15 | Stop reason: SDUPTHER

## 2020-09-25 NOTE — PROGRESS NOTES
Subjective:      Patient ID: Manoj Gutierrez is a 34 y.o. male.    Chief Complaint: Loss of Consciousness and Medication Problem (HBP)      HPI:  Manoj Gutierrez is a 34 year old male with ADHD, back pain, environmental allergies, GERD, hyperlipidemia, and hypertension who presents to clinic today to discuss medication management.    Was previously on metoprolol tartrate 50 mg PO BID prior to establishing care with me.  He saw allergy who recommended alternative class as beta blockers would interfere with allergy shot response so was transitioned to losartan 50 mg by mouth daily.  Received portal message from patient 9/16/20 stated he had 3 episodes of almost blacking out since switching to losartan.    States he was standing up, states everything started going black in his vision (had been standing 4-5 minutes); denies loss of consciousness or fall.  Has happened 2 other times once when sitting.  Denies fox loss of consciousness with any episode.  Endorses palpitations separately from these episodes, once or twice weekly, unsure how long palpitations last, denies associated SOB/CP.  Palpitations were less present when on metoprolol in the past.  Drinks 4-5 glasses of water per day.  Eats 3 - 4 meals daily with snacks in between.        Past Medical History:   Diagnosis Date    Allergy     Elevated transaminase level     Hyperlipidemia     Overweight (BMI 25.0-29.9)        Past Surgical History:   Procedure Laterality Date    SINUS SURGERY         Family History   Problem Relation Age of Onset    Asthma Sister        Social History     Socioeconomic History    Marital status: Single     Spouse name: Not on file    Number of children: Not on file    Years of education: Not on file    Highest education level: Not on file   Occupational History    Not on file   Social Needs    Financial resource strain: Somewhat hard    Food insecurity     Worry: Sometimes true     Inability: Sometimes true     Transportation needs     Medical: No     Non-medical: Yes   Tobacco Use    Smoking status: Never Smoker    Smokeless tobacco: Never Used   Substance and Sexual Activity    Alcohol use: Yes     Frequency: 2-3 times a week     Drinks per session: 1 or 2     Binge frequency: Less than monthly    Drug use: No    Sexual activity: Never   Lifestyle    Physical activity     Days per week: 5 days     Minutes per session: Not on file    Stress: Not at all   Relationships    Social connections     Talks on phone: More than three times a week     Gets together: Twice a week     Attends Baptism service: Not on file     Active member of club or organization: Yes     Attends meetings of clubs or organizations: 1 to 4 times per year     Relationship status: Never    Other Topics Concern    Not on file   Social History Narrative    Not on file       Review of Systems   Constitutional: Negative for chills, fatigue and fever.   HENT: Negative for congestion, hearing loss, nosebleeds, rhinorrhea, sore throat and trouble swallowing.    Eyes: Positive for visual disturbance. Negative for pain.   Respiratory: Negative for cough, shortness of breath and wheezing.    Cardiovascular: Positive for palpitations. Negative for chest pain.   Gastrointestinal: Negative for abdominal distention, abdominal pain, constipation, diarrhea, nausea and vomiting.   Genitourinary: Negative for difficulty urinating, dysuria, frequency, hematuria and urgency.   Musculoskeletal: Negative for arthralgias, back pain, myalgias and neck pain.   Skin: Negative for color change and rash.   Neurological: Negative for dizziness, syncope, speech difficulty, weakness, numbness and headaches.   Psychiatric/Behavioral: Negative for agitation, behavioral problems and confusion. The patient is not nervous/anxious.      Objective:     Vitals:    09/25/20 0942 09/25/20 1010 09/25/20 1015 09/25/20 1020   BP: 110/80 132/85 (!) 133/91 (!) 130/93   BP  "Location: Left arm Left arm Left arm Left arm   Patient Position: Sitting Lying Sitting Standing   BP Method: Medium (Manual) Medium (Automatic) Medium (Automatic) Medium (Automatic)   Pulse: 89 66 77 96   Temp: 98.2 °F (36.8 °C)      TempSrc: Oral      SpO2: 99%      Weight: 70.9 kg (156 lb 4.9 oz)      Height: 5' 5.7" (1.669 m)          Physical Exam  Vitals signs and nursing note reviewed.   Constitutional:       General: He is not in acute distress.     Appearance: He is well-developed.   HENT:      Head: Normocephalic and atraumatic.      Right Ear: Hearing and external ear normal.      Left Ear: Hearing and external ear normal.      Nose: Nose normal. No rhinorrhea.   Eyes:      General: Lids are normal.         Right eye: No discharge.         Left eye: No discharge.      Conjunctiva/sclera: Conjunctivae normal.   Neck:      Musculoskeletal: Neck supple.      Trachea: Trachea normal. No tracheal deviation.   Cardiovascular:      Rate and Rhythm: Normal rate and regular rhythm.      Heart sounds: Normal heart sounds. No murmur. No friction rub. No gallop.    Pulmonary:      Effort: Pulmonary effort is normal. No respiratory distress.      Breath sounds: Normal breath sounds. No wheezing or rales.   Abdominal:      General: Bowel sounds are normal. There is no distension.      Palpations: Abdomen is soft.      Tenderness: There is no abdominal tenderness. There is no guarding or rebound.   Musculoskeletal:         General: No deformity.   Skin:     General: Skin is warm and dry.      Findings: No rash.   Neurological:      Mental Status: He is alert.      Motor: No abnormal muscle tone.   Psychiatric:         Speech: Speech normal.         Behavior: Behavior normal. Behavior is cooperative.         Thought Content: Thought content normal.         Judgment: Judgment normal.        Assessment:      1. Pre-syncope    2. Essential hypertension    3. Palpitations      Plan:   Manoj was seen today for loss of " consciousness and medication problem.    Diagnoses and all orders for this visit:    Pre-syncope  -     CBC auto differential; Future  -     Basic metabolic panel; Future  -     Thyroid stimulating hormone; Future  -     EKG 12-lead; Future  -     Holter monitor - 48 hour; Future  -     Orthostatic vital signs negative in office today  -     Suspect symptoms related to orthostatic hypotension; recommended decreasing daily coffee intake, increasing daily water intake, liberalizing sodium intake slightly, rising to a standing position slowly, isometric hand  when symptomatic.  Check labs, get baseline EKG, check Holter.  Reduce losartan to 25 mg PO QD.  Instructed patient to monitor home blood pressure values daily, keep a log, and notify me of daily values 1 week after reducing losartan dose.  Discussed emergency department precautions.  Labs to [facility] at patient request.    Essential hypertension  -     losartan (COZAAR) 50 MG tablet; Take 0.5 tablets (25 mg total) by mouth once daily.    Palpitations  -     CBC auto differential; Future  -     Basic metabolic panel; Future  -     EKG 12-lead; Future  -     Holter monitor - 48 hour; Future         Answers for HPI/ROS submitted by the patient on 9/19/2020   Hypertension  Chronicity: chronic  Onset: more than 1 year ago  Progression since onset: waxing and waning  Condition status: controlled  anxiety: Yes  blurred vision: No  malaise/fatigue: No  orthopnea: No  peripheral edema: No  PND: No  sweats: No  CAD risks: dyslipidemia, family history  Compliance problems: medication side effects  Past treatments: beta blockers  Improvement on treatment: moderate

## 2020-09-28 ENCOUNTER — HOSPITAL ENCOUNTER (OUTPATIENT)
Dept: CARDIOLOGY | Facility: HOSPITAL | Age: 35
Discharge: HOME | End: 2020-09-28
Attending: FAMILY MEDICINE
Payer: OTHER GOVERNMENT

## 2020-09-28 DIAGNOSIS — R00.2 PALPITATIONS: ICD-10-CM

## 2020-09-28 DIAGNOSIS — R55 PRE-SYNCOPE: ICD-10-CM

## 2020-09-28 PROCEDURE — 93227 HOLTER MONITOR - 48 HOUR (CUPID ONLY): ICD-10-PCS | Mod: ,,, | Performed by: INTERNAL MEDICINE

## 2020-09-28 PROCEDURE — 93227 XTRNL ECG REC<48 HR R&I: CPT | Mod: ,,, | Performed by: INTERNAL MEDICINE

## 2020-09-28 PROCEDURE — 93226 XTRNL ECG REC<48 HR SCAN A/R: CPT

## 2020-09-29 ENCOUNTER — CLINICAL SUPPORT (OUTPATIENT)
Dept: ALLERGY | Facility: CLINIC | Age: 35
End: 2020-09-29
Payer: OTHER GOVERNMENT

## 2020-09-29 DIAGNOSIS — J30.9 CHRONIC ALLERGIC RHINITIS: ICD-10-CM

## 2020-09-29 LAB
BASOPHILS # BLD AUTO: 42 CELLS/UL (ref 0–200)
BASOPHILS NFR BLD AUTO: 0.8 %
BUN SERPL-MCNC: 12 MG/DL (ref 7–25)
BUN/CREAT SERPL: ABNORMAL (CALC) (ref 6–22)
CALCIUM SERPL-MCNC: 9.3 MG/DL (ref 8.6–10.3)
CHLORIDE SERPL-SCNC: 102 MMOL/L (ref 98–110)
CO2 SERPL-SCNC: 30 MMOL/L (ref 20–32)
CREAT SERPL-MCNC: 0.82 MG/DL (ref 0.6–1.35)
EOSINOPHIL # BLD AUTO: 322 CELLS/UL (ref 15–500)
EOSINOPHIL NFR BLD AUTO: 6.2 %
ERYTHROCYTE [DISTWIDTH] IN BLOOD BY AUTOMATED COUNT: 14.5 % (ref 11–15)
GFRSERPLBLD MDRD-ARVRAT: 116 ML/MIN/1.73M2
GLUCOSE SERPL-MCNC: 108 MG/DL (ref 65–99)
HCT VFR BLD AUTO: 34 % (ref 38.5–50)
HGB BLD-MCNC: 11.4 G/DL (ref 13.2–17.1)
LYMPHOCYTES # BLD AUTO: 1929 CELLS/UL (ref 850–3900)
LYMPHOCYTES NFR BLD AUTO: 37.1 %
MCH RBC QN AUTO: 26.4 PG (ref 27–33)
MCHC RBC AUTO-ENTMCNC: 33.5 G/DL (ref 32–36)
MCV RBC AUTO: 78.7 FL (ref 80–100)
MONOCYTES # BLD AUTO: 380 CELLS/UL (ref 200–950)
MONOCYTES NFR BLD AUTO: 7.3 %
NEUTROPHILS # BLD AUTO: 2527 CELLS/UL (ref 1500–7800)
NEUTROPHILS NFR BLD AUTO: 48.6 %
PLATELET # BLD AUTO: 235 THOUSAND/UL (ref 140–400)
PMV BLD REES-ECKER: 13.6 FL (ref 7.5–12.5)
POTASSIUM SERPL-SCNC: 3.6 MMOL/L (ref 3.5–5.3)
RBC # BLD AUTO: 4.32 MILLION/UL (ref 4.2–5.8)
SODIUM SERPL-SCNC: 139 MMOL/L (ref 135–146)
TSH SERPL-ACNC: 0.96 MIU/L (ref 0.4–4.5)
WBC # BLD AUTO: 5.2 THOUSAND/UL (ref 3.8–10.8)

## 2020-09-29 PROCEDURE — 99499 NO LOS: ICD-10-PCS | Mod: S$GLB,,, | Performed by: ALLERGY & IMMUNOLOGY

## 2020-09-29 PROCEDURE — 95115 PR IMMUNOTHERAPY, ONE INJECTION: ICD-10-PCS | Mod: S$GLB,,, | Performed by: ALLERGY & IMMUNOLOGY

## 2020-09-29 PROCEDURE — 95115 IMMUNOTHERAPY ONE INJECTION: CPT | Mod: S$GLB,,, | Performed by: ALLERGY & IMMUNOLOGY

## 2020-09-29 PROCEDURE — 99499 UNLISTED E&M SERVICE: CPT | Mod: S$GLB,,, | Performed by: ALLERGY & IMMUNOLOGY

## 2020-09-30 ENCOUNTER — PATIENT MESSAGE (OUTPATIENT)
Dept: INTERNAL MEDICINE | Facility: CLINIC | Age: 35
End: 2020-09-30

## 2020-09-30 ENCOUNTER — TELEPHONE (OUTPATIENT)
Dept: INTERNAL MEDICINE | Facility: CLINIC | Age: 35
End: 2020-09-30

## 2020-09-30 DIAGNOSIS — D50.9 MICROCYTIC ANEMIA: Primary | ICD-10-CM

## 2020-09-30 DIAGNOSIS — D64.9 ANEMIA, UNSPECIFIED TYPE: Primary | ICD-10-CM

## 2020-09-30 NOTE — TELEPHONE ENCOUNTER
PT and PTT ordered to [facility]; please process/notify pt once complete.  Explanation sent via portal.

## 2020-09-30 NOTE — TELEPHONE ENCOUNTER
Iron studies, retic count, FOBt now.  CBC 1 month.  Explanation sent via portal.  Please process/assist pt with scheduling.

## 2020-10-01 LAB
OHS CV EVENT MONITOR DAY: 2
OHS CV HOLTER LENGTH DECIMAL HOURS: 96
OHS CV HOLTER LENGTH HOURS: 48
OHS CV HOLTER LENGTH MINUTES: 0

## 2020-10-02 ENCOUNTER — LAB VISIT (OUTPATIENT)
Dept: LAB | Facility: HOSPITAL | Age: 35
End: 2020-10-02
Attending: FAMILY MEDICINE
Payer: OTHER GOVERNMENT

## 2020-10-02 ENCOUNTER — PATIENT MESSAGE (OUTPATIENT)
Dept: INTERNAL MEDICINE | Facility: CLINIC | Age: 35
End: 2020-10-02

## 2020-10-02 DIAGNOSIS — D50.9 MICROCYTIC ANEMIA: ICD-10-CM

## 2020-10-02 LAB
APTT PPP: 26 SEC (ref 22–34)
BASOPHILS # BLD AUTO: 29 CELLS/UL (ref 0–200)
BASOPHILS NFR BLD AUTO: 0.5 %
EOSINOPHIL # BLD AUTO: 307 CELLS/UL (ref 15–500)
EOSINOPHIL NFR BLD AUTO: 5.3 %
ERYTHROCYTE [DISTWIDTH] IN BLOOD BY AUTOMATED COUNT: 15 % (ref 11–15)
FERRITIN SERPL-MCNC: 5 NG/ML (ref 38–380)
HCT VFR BLD AUTO: 37.8 % (ref 38.5–50)
HGB BLD-MCNC: 11.8 G/DL (ref 13.2–17.1)
INR PPP: 1
IRON SATN MFR SERPL: 11 % (CALC) (ref 20–48)
IRON SERPL-MCNC: 42 MCG/DL (ref 50–180)
LYMPHOCYTES # BLD AUTO: 1375 CELLS/UL (ref 850–3900)
LYMPHOCYTES NFR BLD AUTO: 23.7 %
MCH RBC QN AUTO: 25.2 PG (ref 27–33)
MCHC RBC AUTO-ENTMCNC: 31.2 G/DL (ref 32–36)
MCV RBC AUTO: 80.6 FL (ref 80–100)
MONOCYTES # BLD AUTO: 406 CELLS/UL (ref 200–950)
MONOCYTES NFR BLD AUTO: 7 %
NEUTROPHILS # BLD AUTO: 3683 CELLS/UL (ref 1500–7800)
NEUTROPHILS NFR BLD AUTO: 63.5 %
PLATELET # BLD AUTO: 237 THOUSAND/UL (ref 140–400)
PMV BLD REES-ECKER: 13.4 FL (ref 7.5–12.5)
PROTHROMBIN TIME: 10 SEC (ref 9–11.5)
RBC # BLD AUTO: 4.69 MILLION/UL (ref 4.2–5.8)
RETICS #: NORMAL CELLS/UL (ref 25000–90000)
RETICS/RBC NFR AUTO: 1.4 %
TIBC SERPL-MCNC: 370 MCG/DL (CALC) (ref 250–425)
WBC # BLD AUTO: 5.8 THOUSAND/UL (ref 3.8–10.8)

## 2020-10-02 PROCEDURE — 82272 OCCULT BLD FECES 1-3 TESTS: CPT

## 2020-10-03 LAB — OB PNL STL: NEGATIVE

## 2020-10-04 ENCOUNTER — TELEPHONE (OUTPATIENT)
Dept: INTERNAL MEDICINE | Facility: CLINIC | Age: 35
End: 2020-10-04

## 2020-10-04 DIAGNOSIS — D50.9 IRON DEFICIENCY ANEMIA, UNSPECIFIED IRON DEFICIENCY ANEMIA TYPE: ICD-10-CM

## 2020-10-04 RX ORDER — FERROUS SULFATE 325(65) MG
325 TABLET ORAL
Qty: 90 TABLET | Refills: 3 | Status: SHIPPED | OUTPATIENT
Start: 2020-10-04 | End: 2021-01-25

## 2020-10-05 ENCOUNTER — PATIENT MESSAGE (OUTPATIENT)
Dept: INTERNAL MEDICINE | Facility: CLINIC | Age: 35
End: 2020-10-05

## 2020-10-06 ENCOUNTER — CLINICAL SUPPORT (OUTPATIENT)
Dept: ALLERGY | Facility: CLINIC | Age: 35
End: 2020-10-06
Payer: OTHER GOVERNMENT

## 2020-10-06 DIAGNOSIS — J30.1 SEASONAL ALLERGIC RHINITIS DUE TO POLLEN: ICD-10-CM

## 2020-10-06 DIAGNOSIS — J30.89 ALLERGIC RHINITIS DUE TO DERMATOPHAGOIDES FARINAE: ICD-10-CM

## 2020-10-06 DIAGNOSIS — J30.81 ALLERGIC RHINITIS DUE TO CATS: ICD-10-CM

## 2020-10-06 DIAGNOSIS — J30.89 ALLERGIC RHINITIS DUE TO DERMATOPHAGOIDES PTERONYSSINUS: ICD-10-CM

## 2020-10-06 DIAGNOSIS — J30.81 ALLERGIC RHINITIS DUE TO DOGS: ICD-10-CM

## 2020-10-06 PROCEDURE — 99499 UNLISTED E&M SERVICE: CPT | Mod: S$GLB,,, | Performed by: ALLERGY & IMMUNOLOGY

## 2020-10-06 PROCEDURE — 99499 NO LOS: ICD-10-PCS | Mod: S$GLB,,, | Performed by: ALLERGY & IMMUNOLOGY

## 2020-10-06 PROCEDURE — 95115 IMMUNOTHERAPY ONE INJECTION: CPT | Mod: S$GLB,,, | Performed by: ALLERGY & IMMUNOLOGY

## 2020-10-06 PROCEDURE — 95115 PR IMMUNOTHERAPY, ONE INJECTION: ICD-10-PCS | Mod: S$GLB,,, | Performed by: ALLERGY & IMMUNOLOGY

## 2020-10-12 ENCOUNTER — CLINICAL SUPPORT (OUTPATIENT)
Dept: ALLERGY | Facility: CLINIC | Age: 35
End: 2020-10-12
Payer: OTHER GOVERNMENT

## 2020-10-12 DIAGNOSIS — J30.81 ALLERGIC RHINITIS DUE TO DOGS: ICD-10-CM

## 2020-10-12 DIAGNOSIS — J30.1 CHRONIC ALLERGIC RHINITIS DUE TO POLLEN: ICD-10-CM

## 2020-10-12 DIAGNOSIS — J30.81 ALLERGIC RHINITIS DUE TO CATS: ICD-10-CM

## 2020-10-12 DIAGNOSIS — J30.89 ALLERGIC RHINITIS DUE TO DERMATOPHAGOIDES FARINAE: ICD-10-CM

## 2020-10-12 DIAGNOSIS — J30.89 ALLERGIC RHINITIS DUE TO DERMATOPHAGOIDES PTERONYSSINUS: ICD-10-CM

## 2020-10-12 PROCEDURE — 95115 PR IMMUNOTHERAPY, ONE INJECTION: ICD-10-PCS | Mod: S$GLB,,, | Performed by: ALLERGY & IMMUNOLOGY

## 2020-10-12 PROCEDURE — 99499 UNLISTED E&M SERVICE: CPT | Mod: S$GLB,,, | Performed by: ALLERGY & IMMUNOLOGY

## 2020-10-12 PROCEDURE — 99999 PR PBB SHADOW E&M-EST. PATIENT-LVL II: CPT | Mod: PBBFAC,,,

## 2020-10-12 PROCEDURE — 95115 IMMUNOTHERAPY ONE INJECTION: CPT | Mod: S$GLB,,, | Performed by: ALLERGY & IMMUNOLOGY

## 2020-10-12 PROCEDURE — 99999 PR PBB SHADOW E&M-EST. PATIENT-LVL II: ICD-10-PCS | Mod: PBBFAC,,,

## 2020-10-12 PROCEDURE — 99499 NO LOS: ICD-10-PCS | Mod: S$GLB,,, | Performed by: ALLERGY & IMMUNOLOGY

## 2020-10-20 ENCOUNTER — CLINICAL SUPPORT (OUTPATIENT)
Dept: ALLERGY | Facility: CLINIC | Age: 35
End: 2020-10-20
Payer: OTHER GOVERNMENT

## 2020-10-20 DIAGNOSIS — J30.89 ALLERGIC RHINITIS DUE TO DERMATOPHAGOIDES PTERONYSSINUS: ICD-10-CM

## 2020-10-20 DIAGNOSIS — J30.89 ALLERGIC RHINITIS DUE TO DERMATOPHAGOIDES FARINAE: ICD-10-CM

## 2020-10-20 DIAGNOSIS — J30.1 ACUTE SEASONAL ALLERGIC RHINITIS DUE TO POLLEN: ICD-10-CM

## 2020-10-20 DIAGNOSIS — J30.81 ALLERGIC RHINITIS DUE TO DOGS: ICD-10-CM

## 2020-10-20 DIAGNOSIS — J30.81 ALLERGIC RHINITIS DUE TO CATS: ICD-10-CM

## 2020-10-20 PROCEDURE — 99999 PR PBB SHADOW E&M-EST. PATIENT-LVL II: CPT | Mod: PBBFAC,,,

## 2020-10-20 PROCEDURE — 99999 PR PBB SHADOW E&M-EST. PATIENT-LVL II: ICD-10-PCS | Mod: PBBFAC,,,

## 2020-10-20 PROCEDURE — 99499 UNLISTED E&M SERVICE: CPT | Mod: S$GLB,,, | Performed by: ALLERGY & IMMUNOLOGY

## 2020-10-20 PROCEDURE — 95115 IMMUNOTHERAPY ONE INJECTION: CPT | Mod: S$GLB,,, | Performed by: ALLERGY & IMMUNOLOGY

## 2020-10-20 PROCEDURE — 99499 NO LOS: ICD-10-PCS | Mod: S$GLB,,, | Performed by: ALLERGY & IMMUNOLOGY

## 2020-10-20 PROCEDURE — 95115 PR IMMUNOTHERAPY, ONE INJECTION: ICD-10-PCS | Mod: S$GLB,,, | Performed by: ALLERGY & IMMUNOLOGY

## 2020-10-23 ENCOUNTER — CLINICAL SUPPORT (OUTPATIENT)
Dept: ALLERGY | Facility: CLINIC | Age: 35
End: 2020-10-23
Payer: OTHER GOVERNMENT

## 2020-10-23 DIAGNOSIS — J30.81 ALLERGIC RHINITIS DUE TO DOGS: ICD-10-CM

## 2020-10-23 DIAGNOSIS — J30.89 ALLERGIC RHINITIS DUE TO DERMATOPHAGOIDES PTERONYSSINUS: ICD-10-CM

## 2020-10-23 DIAGNOSIS — J30.89 ALLERGIC RHINITIS DUE TO DERMATOPHAGOIDES FARINAE: ICD-10-CM

## 2020-10-23 DIAGNOSIS — J30.1 SEASONAL ALLERGIC RHINITIS DUE TO POLLEN: ICD-10-CM

## 2020-10-23 DIAGNOSIS — J30.81 ALLERGIC RHINITIS DUE TO CATS: ICD-10-CM

## 2020-10-23 PROCEDURE — 99499 UNLISTED E&M SERVICE: CPT | Mod: S$GLB,,, | Performed by: ALLERGY & IMMUNOLOGY

## 2020-10-23 PROCEDURE — 95115 PR IMMUNOTHERAPY, ONE INJECTION: ICD-10-PCS | Mod: S$GLB,,, | Performed by: ALLERGY & IMMUNOLOGY

## 2020-10-23 PROCEDURE — 99999 PR PBB SHADOW E&M-EST. PATIENT-LVL II: ICD-10-PCS | Mod: PBBFAC,,,

## 2020-10-23 PROCEDURE — 99499 NO LOS: ICD-10-PCS | Mod: S$GLB,,, | Performed by: ALLERGY & IMMUNOLOGY

## 2020-10-23 PROCEDURE — 95115 IMMUNOTHERAPY ONE INJECTION: CPT | Mod: S$GLB,,, | Performed by: ALLERGY & IMMUNOLOGY

## 2020-10-23 PROCEDURE — 99999 PR PBB SHADOW E&M-EST. PATIENT-LVL II: CPT | Mod: PBBFAC,,,

## 2020-10-30 ENCOUNTER — CLINICAL SUPPORT (OUTPATIENT)
Dept: ALLERGY | Facility: CLINIC | Age: 35
End: 2020-10-30
Payer: OTHER GOVERNMENT

## 2020-10-30 DIAGNOSIS — J30.81 ALLERGIC RHINITIS DUE TO CATS: ICD-10-CM

## 2020-10-30 DIAGNOSIS — J30.89 ALLERGIC RHINITIS DUE TO DERMATOPHAGOIDES FARINAE: ICD-10-CM

## 2020-10-30 DIAGNOSIS — J30.1 SEASONAL ALLERGIC RHINITIS DUE TO POLLEN: ICD-10-CM

## 2020-10-30 DIAGNOSIS — J30.81 ALLERGIC RHINITIS DUE TO DOGS: ICD-10-CM

## 2020-10-30 DIAGNOSIS — J30.89 ALLERGIC RHINITIS DUE TO DERMATOPHAGOIDES PTERONYSSINUS: ICD-10-CM

## 2020-10-30 PROCEDURE — 99999 PR PBB SHADOW E&M-EST. PATIENT-LVL II: ICD-10-PCS | Mod: PBBFAC,,,

## 2020-10-30 PROCEDURE — 99499 NO LOS: ICD-10-PCS | Mod: S$GLB,,, | Performed by: ALLERGY & IMMUNOLOGY

## 2020-10-30 PROCEDURE — 95115 IMMUNOTHERAPY ONE INJECTION: CPT | Mod: S$GLB,,, | Performed by: OTOLARYNGOLOGY

## 2020-10-30 PROCEDURE — 99499 UNLISTED E&M SERVICE: CPT | Mod: S$GLB,,, | Performed by: ALLERGY & IMMUNOLOGY

## 2020-10-30 PROCEDURE — 95115 PR IMMUNOTHERAPY, ONE INJECTION: ICD-10-PCS | Mod: S$GLB,,, | Performed by: OTOLARYNGOLOGY

## 2020-10-30 PROCEDURE — 99999 PR PBB SHADOW E&M-EST. PATIENT-LVL II: CPT | Mod: PBBFAC,,,

## 2020-11-09 ENCOUNTER — TELEPHONE (OUTPATIENT)
Dept: ALLERGY | Facility: CLINIC | Age: 35
End: 2020-11-09

## 2020-11-09 NOTE — TELEPHONE ENCOUNTER
----- Message from Payton Smalls sent at 11/9/2020 11:38 AM CST -----  States he needs to schedule his allergy injection. Please call pt [phone]. Thank you

## 2020-11-13 ENCOUNTER — CLINICAL SUPPORT (OUTPATIENT)
Dept: ALLERGY | Facility: CLINIC | Age: 35
End: 2020-11-13
Payer: OTHER GOVERNMENT

## 2020-11-13 DIAGNOSIS — J30.89 ALLERGIC RHINITIS DUE TO DERMATOPHAGOIDES FARINAE: ICD-10-CM

## 2020-11-13 DIAGNOSIS — J30.1 SEASONAL ALLERGIC RHINITIS DUE TO POLLEN: ICD-10-CM

## 2020-11-13 DIAGNOSIS — J30.81 ALLERGIC RHINITIS DUE TO DOGS: ICD-10-CM

## 2020-11-13 DIAGNOSIS — J30.81 ALLERGIC RHINITIS DUE TO CATS: ICD-10-CM

## 2020-11-13 DIAGNOSIS — J30.89 ALLERGIC RHINITIS DUE TO DERMATOPHAGOIDES PTERONYSSINUS: ICD-10-CM

## 2020-11-13 PROCEDURE — 99499 UNLISTED E&M SERVICE: CPT | Mod: S$GLB,,, | Performed by: ALLERGY & IMMUNOLOGY

## 2020-11-13 PROCEDURE — 95120 IMMUNOTHERAPY ONE INJECTION: CPT | Mod: ,,, | Performed by: ALLERGY & IMMUNOLOGY

## 2020-11-13 PROCEDURE — 95120 PR IMMUNO RX,W EXTRACT,ONE INJECTION: ICD-10-PCS | Mod: ,,, | Performed by: ALLERGY & IMMUNOLOGY

## 2020-11-13 PROCEDURE — 99999 PR PBB SHADOW E&M-EST. PATIENT-LVL II: ICD-10-PCS | Mod: PBBFAC,,,

## 2020-11-13 PROCEDURE — 99999 PR PBB SHADOW E&M-EST. PATIENT-LVL II: CPT | Mod: PBBFAC,,,

## 2020-11-13 PROCEDURE — 99499 NO LOS: ICD-10-PCS | Mod: S$GLB,,, | Performed by: ALLERGY & IMMUNOLOGY

## 2020-11-17 ENCOUNTER — CLINICAL SUPPORT (OUTPATIENT)
Dept: ALLERGY | Facility: CLINIC | Age: 35
End: 2020-11-17
Payer: OTHER GOVERNMENT

## 2020-11-17 DIAGNOSIS — J30.81 ALLERGIC RHINITIS DUE TO DOGS: ICD-10-CM

## 2020-11-17 DIAGNOSIS — J30.81 ALLERGIC RHINITIS DUE TO CATS: ICD-10-CM

## 2020-11-17 DIAGNOSIS — J30.89 ALLERGIC RHINITIS DUE TO DERMATOPHAGOIDES FARINAE: ICD-10-CM

## 2020-11-17 DIAGNOSIS — J30.1 SEASONAL ALLERGIC RHINITIS DUE TO POLLEN: ICD-10-CM

## 2020-11-17 DIAGNOSIS — J30.89 ALLERGIC RHINITIS DUE TO DERMATOPHAGOIDES PTERONYSSINUS: ICD-10-CM

## 2020-11-17 PROCEDURE — 95115 PR IMMUNOTHERAPY, ONE INJECTION: ICD-10-PCS | Mod: S$GLB,,, | Performed by: ALLERGY & IMMUNOLOGY

## 2020-11-17 PROCEDURE — 99999 PR PBB SHADOW E&M-EST. PATIENT-LVL II: CPT | Mod: PBBFAC,,,

## 2020-11-17 PROCEDURE — 99499 UNLISTED E&M SERVICE: CPT | Mod: S$GLB,,, | Performed by: ALLERGY & IMMUNOLOGY

## 2020-11-17 PROCEDURE — 99499 NO LOS: ICD-10-PCS | Mod: S$GLB,,, | Performed by: ALLERGY & IMMUNOLOGY

## 2020-11-17 PROCEDURE — 99999 PR PBB SHADOW E&M-EST. PATIENT-LVL II: ICD-10-PCS | Mod: PBBFAC,,,

## 2020-11-17 PROCEDURE — 95115 IMMUNOTHERAPY ONE INJECTION: CPT | Mod: S$GLB,,, | Performed by: ALLERGY & IMMUNOLOGY

## 2020-11-23 ENCOUNTER — CLINICAL SUPPORT (OUTPATIENT)
Dept: ALLERGY | Facility: CLINIC | Age: 35
End: 2020-11-23
Payer: OTHER GOVERNMENT

## 2020-11-23 DIAGNOSIS — J30.89 ALLERGIC RHINITIS DUE TO DERMATOPHAGOIDES PTERONYSSINUS: ICD-10-CM

## 2020-11-23 DIAGNOSIS — J30.1 SEASONAL ALLERGIC RHINITIS DUE TO POLLEN: ICD-10-CM

## 2020-11-23 DIAGNOSIS — J30.81 ALLERGIC RHINITIS DUE TO DOGS: ICD-10-CM

## 2020-11-23 DIAGNOSIS — J30.89 ALLERGIC RHINITIS DUE TO DERMATOPHAGOIDES FARINAE: ICD-10-CM

## 2020-11-23 DIAGNOSIS — J30.81 ALLERGIC RHINITIS DUE TO CATS: ICD-10-CM

## 2020-11-23 PROCEDURE — 99999 PR PBB SHADOW E&M-EST. PATIENT-LVL II: ICD-10-PCS | Mod: PBBFAC,,,

## 2020-11-23 PROCEDURE — 99999 PR PBB SHADOW E&M-EST. PATIENT-LVL II: CPT | Mod: PBBFAC,,,

## 2020-11-23 PROCEDURE — 99499 NO LOS: ICD-10-PCS | Mod: S$GLB,,, | Performed by: ALLERGY & IMMUNOLOGY

## 2020-11-23 PROCEDURE — 95115 PR IMMUNOTHERAPY, ONE INJECTION: ICD-10-PCS | Mod: S$GLB,,, | Performed by: ALLERGY & IMMUNOLOGY

## 2020-11-23 PROCEDURE — 99499 UNLISTED E&M SERVICE: CPT | Mod: S$GLB,,, | Performed by: ALLERGY & IMMUNOLOGY

## 2020-11-23 PROCEDURE — 95115 IMMUNOTHERAPY ONE INJECTION: CPT | Mod: S$GLB,,, | Performed by: ALLERGY & IMMUNOLOGY

## 2020-12-01 ENCOUNTER — CLINICAL SUPPORT (OUTPATIENT)
Dept: ALLERGY | Facility: CLINIC | Age: 35
End: 2020-12-01
Payer: OTHER GOVERNMENT

## 2020-12-01 DIAGNOSIS — J30.1 SEASONAL ALLERGIC RHINITIS DUE TO POLLEN: ICD-10-CM

## 2020-12-01 DIAGNOSIS — J30.89 ALLERGIC RHINITIS DUE TO DERMATOPHAGOIDES FARINAE: ICD-10-CM

## 2020-12-01 DIAGNOSIS — J30.81 ALLERGIC RHINITIS DUE TO DOGS: ICD-10-CM

## 2020-12-01 DIAGNOSIS — J30.81 ALLERGIC RHINITIS DUE TO CATS: ICD-10-CM

## 2020-12-01 DIAGNOSIS — J30.89 ALLERGIC RHINITIS DUE TO DERMATOPHAGOIDES PTERONYSSINUS: ICD-10-CM

## 2020-12-01 PROCEDURE — 95115 PR IMMUNOTHERAPY, ONE INJECTION: ICD-10-PCS | Mod: S$GLB,,, | Performed by: ALLERGY & IMMUNOLOGY

## 2020-12-01 PROCEDURE — 95115 IMMUNOTHERAPY ONE INJECTION: CPT | Mod: S$GLB,,, | Performed by: ALLERGY & IMMUNOLOGY

## 2020-12-01 PROCEDURE — 99999 PR PBB SHADOW E&M-EST. PATIENT-LVL II: CPT | Mod: PBBFAC,,,

## 2020-12-01 PROCEDURE — 99499 NO LOS: ICD-10-PCS | Mod: S$GLB,,, | Performed by: ALLERGY & IMMUNOLOGY

## 2020-12-01 PROCEDURE — 99999 PR PBB SHADOW E&M-EST. PATIENT-LVL II: ICD-10-PCS | Mod: PBBFAC,,,

## 2020-12-01 PROCEDURE — 99499 UNLISTED E&M SERVICE: CPT | Mod: S$GLB,,, | Performed by: ALLERGY & IMMUNOLOGY

## 2020-12-04 ENCOUNTER — CLINICAL SUPPORT (OUTPATIENT)
Dept: ALLERGY | Facility: CLINIC | Age: 35
End: 2020-12-04
Payer: OTHER GOVERNMENT

## 2020-12-04 DIAGNOSIS — J30.1 SEASONAL ALLERGIC RHINITIS DUE TO POLLEN: ICD-10-CM

## 2020-12-04 DIAGNOSIS — J30.81 ALLERGIC RHINITIS DUE TO DOGS: ICD-10-CM

## 2020-12-04 DIAGNOSIS — J30.81 ALLERGIC RHINITIS DUE TO CATS: ICD-10-CM

## 2020-12-04 PROCEDURE — 99999 PR PBB SHADOW E&M-EST. PATIENT-LVL II: CPT | Mod: PBBFAC,,,

## 2020-12-04 PROCEDURE — 99499 UNLISTED E&M SERVICE: CPT | Mod: S$GLB,,, | Performed by: ALLERGY & IMMUNOLOGY

## 2020-12-04 PROCEDURE — 95115 IMMUNOTHERAPY ONE INJECTION: CPT | Mod: S$GLB,,, | Performed by: ALLERGY & IMMUNOLOGY

## 2020-12-04 PROCEDURE — 95115 PR IMMUNOTHERAPY, ONE INJECTION: ICD-10-PCS | Mod: S$GLB,,, | Performed by: ALLERGY & IMMUNOLOGY

## 2020-12-04 PROCEDURE — 99999 PR PBB SHADOW E&M-EST. PATIENT-LVL II: ICD-10-PCS | Mod: PBBFAC,,,

## 2020-12-04 PROCEDURE — 99499 NO LOS: ICD-10-PCS | Mod: S$GLB,,, | Performed by: ALLERGY & IMMUNOLOGY

## 2020-12-04 NOTE — PROGRESS NOTES
....See Flowsheet for immunotherapy administration. Patient waited in clinic 30 min for observation. Pt had epi pen on hand.

## 2020-12-08 ENCOUNTER — OFFICE VISIT (OUTPATIENT)
Dept: ALLERGY | Facility: CLINIC | Age: 35
End: 2020-12-08
Payer: OTHER GOVERNMENT

## 2020-12-08 ENCOUNTER — CLINICAL SUPPORT (OUTPATIENT)
Dept: ALLERGY | Facility: CLINIC | Age: 35
End: 2020-12-08
Payer: OTHER GOVERNMENT

## 2020-12-08 ENCOUNTER — PATIENT MESSAGE (OUTPATIENT)
Dept: ALLERGY | Facility: CLINIC | Age: 35
End: 2020-12-08

## 2020-12-08 DIAGNOSIS — J30.81 ALLERGIC RHINITIS DUE TO DOGS: ICD-10-CM

## 2020-12-08 DIAGNOSIS — T80.69XA ALLERGIC REACTION TO VACCINE: Primary | ICD-10-CM

## 2020-12-08 DIAGNOSIS — J30.81 ALLERGIC RHINITIS DUE TO CATS: ICD-10-CM

## 2020-12-08 DIAGNOSIS — J30.89 ALLERGIC RHINITIS DUE TO DERMATOPHAGOIDES FARINAE: ICD-10-CM

## 2020-12-08 DIAGNOSIS — T88.6XXA ANAPHYLAXIS DUE TO ALLERGEN IMMUNOTHERAPY: Primary | ICD-10-CM

## 2020-12-08 DIAGNOSIS — J30.1 SEASONAL ALLERGIC RHINITIS DUE TO POLLEN: ICD-10-CM

## 2020-12-08 DIAGNOSIS — J30.89 ALLERGIC RHINITIS DUE TO DERMATOPHAGOIDES PTERONYSSINUS: ICD-10-CM

## 2020-12-08 DIAGNOSIS — T45.0X5A ANAPHYLAXIS DUE TO ALLERGEN IMMUNOTHERAPY: Primary | ICD-10-CM

## 2020-12-08 PROCEDURE — 99214 OFFICE O/P EST MOD 30 MIN: CPT | Mod: S$GLB,,, | Performed by: ALLERGY & IMMUNOLOGY

## 2020-12-08 PROCEDURE — 95115 PR IMMUNOTHERAPY, ONE INJECTION: ICD-10-PCS | Mod: S$GLB,,, | Performed by: ALLERGY & IMMUNOLOGY

## 2020-12-08 PROCEDURE — 99999 PR PBB SHADOW E&M-EST. PATIENT-LVL II: CPT | Mod: PBBFAC,,,

## 2020-12-08 PROCEDURE — 99499 NO LOS: ICD-10-PCS | Mod: S$GLB,,, | Performed by: ALLERGY & IMMUNOLOGY

## 2020-12-08 PROCEDURE — 95115 IMMUNOTHERAPY ONE INJECTION: CPT | Mod: S$GLB,,, | Performed by: ALLERGY & IMMUNOLOGY

## 2020-12-08 PROCEDURE — 99214 PR OFFICE/OUTPT VISIT, EST, LEVL IV, 30-39 MIN: ICD-10-PCS | Mod: S$GLB,,, | Performed by: ALLERGY & IMMUNOLOGY

## 2020-12-08 PROCEDURE — 99999 PR PBB SHADOW E&M-EST. PATIENT-LVL II: ICD-10-PCS | Mod: PBBFAC,,,

## 2020-12-08 PROCEDURE — 99499 UNLISTED E&M SERVICE: CPT | Mod: S$GLB,,, | Performed by: ALLERGY & IMMUNOLOGY

## 2020-12-08 RX ORDER — PREDNISONE 10 MG/1
40 TABLET ORAL
Status: COMPLETED | OUTPATIENT
Start: 2020-12-08 | End: 2020-12-08

## 2020-12-08 RX ORDER — CETIRIZINE HYDROCHLORIDE 10 MG/1
10 TABLET ORAL
Status: COMPLETED | OUTPATIENT
Start: 2020-12-08 | End: 2020-12-08

## 2020-12-08 RX ADMIN — PREDNISONE 40 MG: 10 TABLET ORAL at 08:12

## 2020-12-08 RX ADMIN — CETIRIZINE HYDROCHLORIDE 10 MG: 10 TABLET ORAL at 08:12

## 2020-12-08 NOTE — TELEPHONE ENCOUNTER
Spoke with pt, appt scheduled for 12/17/20 at 8:30am ([facility] location).  Inj scheduled for 12/14/20 cancelled.

## 2020-12-08 NOTE — PROGRESS NOTES
Shortly after allergy immunotherapy injection today, he complained of hands tingling and shortness of breath.      Upon arrival to the room, his face was flushed and speech was pressured.    At 7:51, he was given his Epipen auto-injector.     Alert and oriented  CV: RRR without m/g/r  Lungs: fair air movement    Shortly after he vomited at 7:53 am

## 2020-12-08 NOTE — PROGRESS NOTES
See Flowsheet for immunotherapy administration. Patient waited in clinic 30 min for observation. Pt had epi pen on hand.    Roughly 3 minutes after receiving allergy injection patient came to the nurses station complaining  of nose itching and patient is vomiting.  informed  and ordered Epi Pen to be given at 7:50 a.m. again at 8:06 a.m. and 8:23 a.m.   8:19 a.m.Ambulance called to pick patient up and take to the [facility],Fire department EMT present  .8:28 a.m /80 P.79 T98.2.  8:45 a.m.Patient sent to the ER via ambulance on stretcher.See Dr. Noriega's chart note for more detail.

## 2020-12-08 NOTE — PROGRESS NOTES
35 year old male with a history of allergic rhinitis on subcutaneous allergy immunotherapy.  Shortly after his injection today, he complained of tingling hands and shortness of breath.  Epinephrine 0.3mg was given immediately in his left leg.  His face was flushed, pressured speech  Lungs: Fair air movement, no wheeze, no rhonchi  CV: RRR without m/g/r, nl S1 and S2  CV: RRR without m/g/r    PMH, SH, FM, Al - unchanged from previous visit- 8/21/2020  ROS: see note, otherwise negative.    Shortly after Epi was given, he began to vomit.    He reported feeling better.   He later complained of hives and pruritus  A second dose of Epi was given.    10 to 15 minutes later, he reported no resolution of symptoms.    911 was called and a 3rd dose of Epinephrine was given.    EMT transferred the patient to the hospital.      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      He was monitored and treated for an hour. Face to Face time consisted of over half of this visit, which consisted of coordinating care, treatment, and monitoring symptoms relief.

## 2020-12-09 ENCOUNTER — PATIENT MESSAGE (OUTPATIENT)
Dept: INTERNAL MEDICINE | Facility: CLINIC | Age: 35
End: 2020-12-09

## 2020-12-16 ENCOUNTER — PATIENT MESSAGE (OUTPATIENT)
Dept: INTERNAL MEDICINE | Facility: CLINIC | Age: 35
End: 2020-12-16

## 2020-12-17 ENCOUNTER — OFFICE VISIT (OUTPATIENT)
Dept: ALLERGY | Facility: CLINIC | Age: 35
End: 2020-12-17
Payer: OTHER GOVERNMENT

## 2020-12-17 ENCOUNTER — PATIENT MESSAGE (OUTPATIENT)
Dept: INTERNAL MEDICINE | Facility: CLINIC | Age: 35
End: 2020-12-17

## 2020-12-17 VITALS
DIASTOLIC BLOOD PRESSURE: 84 MMHG | BODY MASS INDEX: 25.73 KG/M2 | HEART RATE: 70 BPM | WEIGHT: 169.75 LBS | TEMPERATURE: 96 F | HEIGHT: 68 IN | SYSTOLIC BLOOD PRESSURE: 130 MMHG

## 2020-12-17 DIAGNOSIS — J30.81 ALLERGIC RHINITIS DUE TO ANIMAL (CAT) (DOG) HAIR AND DANDER: ICD-10-CM

## 2020-12-17 DIAGNOSIS — J30.89 ALLERGIC RHINITIS DUE TO AMERICAN HOUSE DUST MITE: ICD-10-CM

## 2020-12-17 DIAGNOSIS — J30.1 SEASONAL ALLERGIC RHINITIS DUE TO POLLEN: Primary | ICD-10-CM

## 2020-12-17 PROCEDURE — 99999 PR PBB SHADOW E&M-EST. PATIENT-LVL III: CPT | Mod: PBBFAC,,, | Performed by: ALLERGY & IMMUNOLOGY

## 2020-12-17 PROCEDURE — 99999 PR PBB SHADOW E&M-EST. PATIENT-LVL III: ICD-10-PCS | Mod: PBBFAC,,, | Performed by: ALLERGY & IMMUNOLOGY

## 2020-12-17 PROCEDURE — 99214 PR OFFICE/OUTPT VISIT, EST, LEVL IV, 30-39 MIN: ICD-10-PCS | Mod: S$GLB,,, | Performed by: ALLERGY & IMMUNOLOGY

## 2020-12-17 PROCEDURE — 99214 OFFICE O/P EST MOD 30 MIN: CPT | Mod: S$GLB,,, | Performed by: ALLERGY & IMMUNOLOGY

## 2020-12-17 NOTE — PROGRESS NOTES
Subjective:       Patient ID: Manoj Gutierrez is a 35 y.o. male.    Chief Complaint:  Follow-up      HPI: 35 year old male with a history of allergic rhinitis on allergy immunohterpay. He had a systemic reaction on 12/8/2020 requiring 3 doses of epinephrine. He was transferred to the Hospital via EMS and monitored in the ER for 4 hours.  He reports that he did appreciate an improvement in Fall seasonal allergies. He pet his sister's cat and he did not have symptoms, which he has in the past.    Cetirizine, Cozzarr, Omeprazole every morning  Xyzal during allergy season      Past Medical History:   Diagnosis Date    Allergy     Elevated transaminase level     Hyperlipidemia     Overweight (BMI 25.0-29.9)      Family History   Problem Relation Age of Onset    Asthma Sister      Current Outpatient Medications on File Prior to Visit   Medication Sig Dispense Refill    alirocumab (PRALUENT PEN) 75 mg/mL PnIj Inject 1 mL (75 mg total) into the skin every 14 (fourteen) days. 2 mL 11    cetirizine (ZYRTEC) 10 MG tablet Take 10 mg by mouth once daily.      EPINEPHrine (EPIPEN) 0.3 mg/0.3 mL AtIn Inject 0.3 mLs (0.3 mg total) into the muscle once. for 1 dose 2 Device 3    ferrous sulfate (FEOSOL) 325 mg (65 mg iron) Tab tablet Take 1 tablet (325 mg total) by mouth daily with breakfast. 90 tablet 3    levocetirizine (XYZAL) 5 MG tablet       losartan (COZAAR) 50 MG tablet Take 0.5 tablets (25 mg total) by mouth once daily. 45 tablet 3    mometasone (NASONEX) 50 mcg/actuation nasal spray 2 sprays by Nasal route once daily. 17 g 12    omeprazole (PRILOSEC) 40 MG capsule TAKE 1 CAPSULE BY MOUTH EVERY DAY 60 capsule 4    triamcinolone acetonide 0.1% (KENALOG) 0.1 % cream Apply topically 2 (two) times daily. 45 g 2     No current facility-administered medications on file prior to visit.      Review of patient's allergies indicates:   Allergen Reactions    Cat/feline products     Grass pollen-june grass standard  Other (See Comments)       Environmental History: Dog, fish  Review of Systems   Constitutional: Negative for chills and fever.   HENT: Positive for rhinorrhea. Negative for congestion.    Eyes: Negative for discharge and itching.   Respiratory: Negative for chest tightness, shortness of breath and wheezing.    Cardiovascular: Negative for chest pain and leg swelling.   Gastrointestinal: Negative for nausea and vomiting.   Skin: Negative for rash and wound.   Allergic/Immunologic: Positive for environmental allergies. Negative for food allergies.   Neurological: Negative for facial asymmetry and speech difficulty.   Hematological: Negative for adenopathy. Does not bruise/bleed easily.   Psychiatric/Behavioral: Negative for behavioral problems and suicidal ideas.        Objective:    Physical Exam  Constitutional:       General: He is not in acute distress.     Appearance: Normal appearance. He is not ill-appearing, toxic-appearing or diaphoretic.   HENT:      Head: Normocephalic and atraumatic.      Right Ear: Tympanic membrane, ear canal and external ear normal. There is no impacted cerumen.      Left Ear: Tympanic membrane, ear canal and external ear normal. There is no impacted cerumen.      Nose: Nose normal. No congestion or rhinorrhea.      Mouth/Throat:      Mouth: Mucous membranes are moist.      Pharynx: Oropharynx is clear. No oropharyngeal exudate or posterior oropharyngeal erythema.   Eyes:      General: No scleral icterus.        Right eye: No discharge.         Left eye: No discharge.      Pupils: Pupils are equal, round, and reactive to light.   Neck:      Musculoskeletal: Normal range of motion and neck supple. No neck rigidity or muscular tenderness.      Thyroid: No thyromegaly.   Cardiovascular:      Rate and Rhythm: Normal rate and regular rhythm.      Heart sounds: Normal heart sounds. No murmur. No friction rub. No gallop.    Pulmonary:      Effort: Pulmonary effort is normal. No respiratory  distress.      Breath sounds: Normal breath sounds. No stridor. No wheezing, rhonchi or rales.   Chest:      Chest wall: No tenderness.   Abdominal:      General: Bowel sounds are normal. There is no distension.      Palpations: Abdomen is soft. There is no mass.      Tenderness: There is no abdominal tenderness. There is no guarding or rebound.      Hernia: No hernia is present.   Musculoskeletal: Normal range of motion.         General: No swelling or deformity.      Right lower leg: No edema.      Left lower leg: No edema.   Lymphadenopathy:      Cervical: No cervical adenopathy.   Skin:     General: Skin is warm and dry.      Coloration: Skin is not jaundiced.      Findings: No bruising or rash.   Neurological:      Mental Status: He is alert and oriented to person, place, and time.      Gait: Gait normal.   Psychiatric:         Mood and Affect: Mood normal.         Behavior: Behavior normal.         Thought Content: Thought content normal.           Assessment:       1. Seasonal allergic rhinitis due to pollen    2. Allergic rhinitis due to [place] house dust mite    3. Allergic rhinitis due to animal (cat) (dog) hair and dander         Plan:       Given his significant systemic reaction to allergy immunotherapy, will stop allergy immunotherapy. Will manage symptoms with medications. He was in agreement with the plan.    Seasonal allergic rhinitis due to pollen    Allergic rhinitis due to [place] house dust mite    Allergic rhinitis due to animal (cat) (dog) hair and dander    RTC 4-6 months or sooner, if needed.    DOMINGA DRISCOLL

## 2021-01-04 ENCOUNTER — PATIENT MESSAGE (OUTPATIENT)
Dept: INTERNAL MEDICINE | Facility: CLINIC | Age: 36
End: 2021-01-04

## 2021-01-04 DIAGNOSIS — E78.5 HYPERLIPIDEMIA, UNSPECIFIED HYPERLIPIDEMIA TYPE: ICD-10-CM

## 2021-01-14 ENCOUNTER — PATIENT MESSAGE (OUTPATIENT)
Dept: INTERNAL MEDICINE | Facility: CLINIC | Age: 36
End: 2021-01-14

## 2021-01-14 ENCOUNTER — OFFICE VISIT (OUTPATIENT)
Dept: INTERNAL MEDICINE | Facility: CLINIC | Age: 36
End: 2021-01-14
Payer: OTHER GOVERNMENT

## 2021-01-14 VITALS
HEIGHT: 68 IN | BODY MASS INDEX: 25.66 KG/M2 | WEIGHT: 169.31 LBS | SYSTOLIC BLOOD PRESSURE: 124 MMHG | HEART RATE: 89 BPM | OXYGEN SATURATION: 98 % | DIASTOLIC BLOOD PRESSURE: 86 MMHG

## 2021-01-14 DIAGNOSIS — Z11.4 SCREENING FOR HIV (HUMAN IMMUNODEFICIENCY VIRUS): ICD-10-CM

## 2021-01-14 DIAGNOSIS — E78.5 HYPERLIPIDEMIA, UNSPECIFIED HYPERLIPIDEMIA TYPE: ICD-10-CM

## 2021-01-14 DIAGNOSIS — F90.9 ATTENTION DEFICIT HYPERACTIVITY DISORDER (ADHD), UNSPECIFIED ADHD TYPE: ICD-10-CM

## 2021-01-14 DIAGNOSIS — M25.561 ACUTE PAIN OF RIGHT KNEE: ICD-10-CM

## 2021-01-14 DIAGNOSIS — I10 ESSENTIAL HYPERTENSION: ICD-10-CM

## 2021-01-14 DIAGNOSIS — Z00.00 ANNUAL PHYSICAL EXAM: ICD-10-CM

## 2021-01-14 DIAGNOSIS — K21.9 GASTROESOPHAGEAL REFLUX DISEASE, UNSPECIFIED WHETHER ESOPHAGITIS PRESENT: ICD-10-CM

## 2021-01-14 DIAGNOSIS — F41.9 ANXIETY: ICD-10-CM

## 2021-01-14 PROCEDURE — 99395 PR PREVENTIVE VISIT,EST,18-39: ICD-10-PCS | Mod: S$GLB,,, | Performed by: FAMILY MEDICINE

## 2021-01-14 PROCEDURE — 99999 PR PBB SHADOW E&M-EST. PATIENT-LVL IV: ICD-10-PCS | Mod: PBBFAC,,, | Performed by: FAMILY MEDICINE

## 2021-01-14 PROCEDURE — 99999 PR PBB SHADOW E&M-EST. PATIENT-LVL IV: CPT | Mod: PBBFAC,,, | Performed by: FAMILY MEDICINE

## 2021-01-14 PROCEDURE — 99395 PREV VISIT EST AGE 18-39: CPT | Mod: S$GLB,,, | Performed by: FAMILY MEDICINE

## 2021-01-15 ENCOUNTER — LAB VISIT (OUTPATIENT)
Dept: LAB | Facility: HOSPITAL | Age: 36
End: 2021-01-15
Payer: OTHER GOVERNMENT

## 2021-01-15 DIAGNOSIS — E78.5 HYPERLIPIDEMIA, UNSPECIFIED HYPERLIPIDEMIA TYPE: ICD-10-CM

## 2021-01-15 DIAGNOSIS — Z00.00 ANNUAL PHYSICAL EXAM: ICD-10-CM

## 2021-01-15 DIAGNOSIS — Z11.4 SCREENING FOR HIV (HUMAN IMMUNODEFICIENCY VIRUS): ICD-10-CM

## 2021-01-15 LAB
ALBUMIN SERPL BCP-MCNC: 4.3 G/DL (ref 3.5–5.2)
ALP SERPL-CCNC: 55 U/L (ref 55–135)
ALT SERPL W/O P-5'-P-CCNC: 44 U/L (ref 10–44)
ANION GAP SERPL CALC-SCNC: 6 MMOL/L (ref 8–16)
AST SERPL-CCNC: 25 U/L (ref 10–40)
BASOPHILS # BLD AUTO: 0.03 K/UL (ref 0–0.2)
BASOPHILS NFR BLD: 0.7 % (ref 0–1.9)
BILIRUB SERPL-MCNC: 0.5 MG/DL (ref 0.1–1)
BUN SERPL-MCNC: 18 MG/DL (ref 6–20)
CALCIUM SERPL-MCNC: 9 MG/DL (ref 8.7–10.5)
CHLORIDE SERPL-SCNC: 101 MMOL/L (ref 95–110)
CHOLEST SERPL-MCNC: 166 MG/DL (ref 120–199)
CHOLEST/HDLC SERPL: 4 {RATIO} (ref 2–5)
CO2 SERPL-SCNC: 30 MMOL/L (ref 23–29)
CREAT SERPL-MCNC: 0.9 MG/DL (ref 0.5–1.4)
DIFFERENTIAL METHOD: ABNORMAL
EOSINOPHIL # BLD AUTO: 0.1 K/UL (ref 0–0.5)
EOSINOPHIL NFR BLD: 2.6 % (ref 0–8)
ERYTHROCYTE [DISTWIDTH] IN BLOOD BY AUTOMATED COUNT: 14.6 % (ref 11.5–14.5)
EST. GFR  (AFRICAN AMERICAN): >60 ML/MIN/1.73 M^2
EST. GFR  (NON AFRICAN AMERICAN): >60 ML/MIN/1.73 M^2
GLUCOSE SERPL-MCNC: 92 MG/DL (ref 70–110)
HCT VFR BLD AUTO: 40.9 % (ref 40–54)
HDLC SERPL-MCNC: 41 MG/DL (ref 40–75)
HDLC SERPL: 24.7 % (ref 20–50)
HGB BLD-MCNC: 12.8 G/DL (ref 14–18)
IMM GRANULOCYTES # BLD AUTO: 0 K/UL (ref 0–0.04)
IMM GRANULOCYTES NFR BLD AUTO: 0 % (ref 0–0.5)
LDLC SERPL CALC-MCNC: 99.2 MG/DL (ref 63–159)
LYMPHOCYTES # BLD AUTO: 1.2 K/UL (ref 1–4.8)
LYMPHOCYTES NFR BLD: 29.5 % (ref 18–48)
MCH RBC QN AUTO: 26.6 PG (ref 27–31)
MCHC RBC AUTO-ENTMCNC: 31.3 G/DL (ref 32–36)
MCV RBC AUTO: 85 FL (ref 82–98)
MONOCYTES # BLD AUTO: 0.4 K/UL (ref 0.3–1)
MONOCYTES NFR BLD: 8.4 % (ref 4–15)
NEUTROPHILS # BLD AUTO: 2.5 K/UL (ref 1.8–7.7)
NEUTROPHILS NFR BLD: 58.8 % (ref 38–73)
NONHDLC SERPL-MCNC: 125 MG/DL
NRBC BLD-RTO: 0 /100 WBC
PLATELET # BLD AUTO: 194 K/UL (ref 150–350)
PMV BLD AUTO: 13.8 FL (ref 9.2–12.9)
POTASSIUM SERPL-SCNC: 4 MMOL/L (ref 3.5–5.1)
PROT SERPL-MCNC: 7.6 G/DL (ref 6–8.4)
RBC # BLD AUTO: 4.82 M/UL (ref 4.6–6.2)
SODIUM SERPL-SCNC: 137 MMOL/L (ref 136–145)
TRIGL SERPL-MCNC: 129 MG/DL (ref 30–150)
WBC # BLD AUTO: 4.17 K/UL (ref 3.9–12.7)

## 2021-01-15 PROCEDURE — 86703 HIV-1/HIV-2 1 RESULT ANTBDY: CPT

## 2021-01-15 PROCEDURE — 82306 VITAMIN D 25 HYDROXY: CPT

## 2021-01-15 PROCEDURE — 80053 COMPREHEN METABOLIC PANEL: CPT

## 2021-01-15 PROCEDURE — 80061 LIPID PANEL: CPT

## 2021-01-15 PROCEDURE — 85025 COMPLETE CBC W/AUTO DIFF WBC: CPT

## 2021-01-15 PROCEDURE — 36415 COLL VENOUS BLD VENIPUNCTURE: CPT

## 2021-01-16 LAB — 25(OH)D3+25(OH)D2 SERPL-MCNC: 27 NG/ML (ref 30–96)

## 2021-01-18 LAB — HIV 1+2 AB+HIV1 P24 AG SERPL QL IA: NEGATIVE

## 2021-01-19 NOTE — PROGRESS NOTES
Pt is calling thinks that he is bleeding from his hemorrhoids- he would like to know what to take to stop the bleeding. He states it happens when he has a bowel movement. It started about 4 days ago. He denies abd pain, nausea, vomting, fever chills. He states he has a little diarrhea. Please call him at .    See Media Tab for Allergy Injection information.

## 2021-01-20 ENCOUNTER — TELEPHONE (OUTPATIENT)
Dept: INTERNAL MEDICINE | Facility: CLINIC | Age: 36
End: 2021-01-20

## 2021-01-20 DIAGNOSIS — E55.9 VITAMIN D DEFICIENCY: ICD-10-CM

## 2021-01-20 DIAGNOSIS — D50.9 IRON DEFICIENCY ANEMIA, UNSPECIFIED IRON DEFICIENCY ANEMIA TYPE: Primary | ICD-10-CM

## 2021-01-21 ENCOUNTER — TELEPHONE (OUTPATIENT)
Dept: INTERNAL MEDICINE | Facility: CLINIC | Age: 36
End: 2021-01-21

## 2021-01-22 ENCOUNTER — OFFICE VISIT (OUTPATIENT)
Dept: OPHTHALMOLOGY | Facility: CLINIC | Age: 36
End: 2021-01-22
Payer: OTHER GOVERNMENT

## 2021-01-22 ENCOUNTER — LAB VISIT (OUTPATIENT)
Dept: LAB | Facility: HOSPITAL | Age: 36
End: 2021-01-22
Attending: FAMILY MEDICINE
Payer: OTHER GOVERNMENT

## 2021-01-22 DIAGNOSIS — H35.413 BILATERAL RETINAL LATTICE DEGENERATION: ICD-10-CM

## 2021-01-22 DIAGNOSIS — H52.7 REFRACTIVE DISORDER: ICD-10-CM

## 2021-01-22 DIAGNOSIS — H02.883 MEIBOMIAN GLAND DYSFUNCTION (MGD) OF BOTH EYES: Primary | ICD-10-CM

## 2021-01-22 DIAGNOSIS — D50.9 IRON DEFICIENCY ANEMIA, UNSPECIFIED IRON DEFICIENCY ANEMIA TYPE: ICD-10-CM

## 2021-01-22 DIAGNOSIS — H02.886 MEIBOMIAN GLAND DYSFUNCTION (MGD) OF BOTH EYES: Primary | ICD-10-CM

## 2021-01-22 LAB
FERRITIN SERPL-MCNC: 10 NG/ML (ref 20–300)
IRON SERPL-MCNC: 55 UG/DL (ref 45–160)
SATURATED IRON: 13 % (ref 20–50)
TOTAL IRON BINDING CAPACITY: 434 UG/DL (ref 250–450)
TRANSFERRIN SERPL-MCNC: 293 MG/DL (ref 200–375)

## 2021-01-22 PROCEDURE — 92015 PR REFRACTION: ICD-10-PCS | Mod: S$GLB,,, | Performed by: STUDENT IN AN ORGANIZED HEALTH CARE EDUCATION/TRAINING PROGRAM

## 2021-01-22 PROCEDURE — 92004 PR EYE EXAM, NEW PATIENT,COMPREHESV: ICD-10-PCS | Mod: S$GLB,,, | Performed by: STUDENT IN AN ORGANIZED HEALTH CARE EDUCATION/TRAINING PROGRAM

## 2021-01-22 PROCEDURE — 92015 DETERMINE REFRACTIVE STATE: CPT | Mod: S$GLB,,, | Performed by: STUDENT IN AN ORGANIZED HEALTH CARE EDUCATION/TRAINING PROGRAM

## 2021-01-22 PROCEDURE — 92134 POSTERIOR SEGMENT OCT RETINA (OCULAR COHERENCE TOMOGRAPHY)-BOTH EYES: ICD-10-PCS | Mod: S$GLB,,, | Performed by: STUDENT IN AN ORGANIZED HEALTH CARE EDUCATION/TRAINING PROGRAM

## 2021-01-22 PROCEDURE — 92004 COMPRE OPH EXAM NEW PT 1/>: CPT | Mod: S$GLB,,, | Performed by: STUDENT IN AN ORGANIZED HEALTH CARE EDUCATION/TRAINING PROGRAM

## 2021-01-22 PROCEDURE — 83540 ASSAY OF IRON: CPT

## 2021-01-22 PROCEDURE — 82728 ASSAY OF FERRITIN: CPT

## 2021-01-22 PROCEDURE — 99999 PR PBB SHADOW E&M-EST. PATIENT-LVL II: CPT | Mod: PBBFAC,,, | Performed by: STUDENT IN AN ORGANIZED HEALTH CARE EDUCATION/TRAINING PROGRAM

## 2021-01-22 PROCEDURE — 92134 CPTRZ OPH DX IMG PST SGM RTA: CPT | Mod: S$GLB,,, | Performed by: STUDENT IN AN ORGANIZED HEALTH CARE EDUCATION/TRAINING PROGRAM

## 2021-01-22 PROCEDURE — 36415 COLL VENOUS BLD VENIPUNCTURE: CPT

## 2021-01-22 PROCEDURE — 99999 PR PBB SHADOW E&M-EST. PATIENT-LVL II: ICD-10-PCS | Mod: PBBFAC,,, | Performed by: STUDENT IN AN ORGANIZED HEALTH CARE EDUCATION/TRAINING PROGRAM

## 2021-01-25 ENCOUNTER — TELEPHONE (OUTPATIENT)
Dept: INTERNAL MEDICINE | Facility: CLINIC | Age: 36
End: 2021-01-25

## 2021-01-25 DIAGNOSIS — D50.9 IRON DEFICIENCY ANEMIA, UNSPECIFIED IRON DEFICIENCY ANEMIA TYPE: ICD-10-CM

## 2021-01-25 RX ORDER — FERROUS SULFATE 325(65) MG
325 TABLET ORAL 2 TIMES DAILY
Qty: 180 TABLET | Refills: 3 | Status: SHIPPED | OUTPATIENT
Start: 2021-01-25 | End: 2022-01-05

## 2021-02-15 RX ORDER — LEVOCETIRIZINE DIHYDROCHLORIDE 5 MG/1
5 TABLET, FILM COATED ORAL DAILY
Qty: 30 TABLET | Refills: 5 | Status: SHIPPED | OUTPATIENT
Start: 2021-02-15 | End: 2021-09-03

## 2021-03-02 ENCOUNTER — PATIENT MESSAGE (OUTPATIENT)
Dept: INTERNAL MEDICINE | Facility: CLINIC | Age: 36
End: 2021-03-02

## 2021-03-13 ENCOUNTER — IMMUNIZATION (OUTPATIENT)
Dept: PRIMARY CARE CLINIC | Facility: CLINIC | Age: 36
End: 2021-03-13
Payer: OTHER GOVERNMENT

## 2021-03-13 DIAGNOSIS — Z23 NEED FOR VACCINATION: Primary | ICD-10-CM

## 2021-03-13 PROCEDURE — 0001A PR IMMUNIZ ADMIN, SARS-COV-2 COVID-19 VACC, 30MCG/0.3ML, 1ST DOSE: ICD-10-PCS | Mod: CV19,S$GLB,, | Performed by: INTERNAL MEDICINE

## 2021-03-13 PROCEDURE — 0001A PR IMMUNIZ ADMIN, SARS-COV-2 COVID-19 VACC, 30MCG/0.3ML, 1ST DOSE: CPT | Mod: CV19,S$GLB,, | Performed by: INTERNAL MEDICINE

## 2021-03-13 PROCEDURE — 91300 PR SARS-COV- 2 COVID-19 VACCINE, NO PRSV, 30MCG/0.3ML, IM: ICD-10-PCS | Mod: S$GLB,,, | Performed by: INTERNAL MEDICINE

## 2021-03-13 PROCEDURE — 91300 PR SARS-COV- 2 COVID-19 VACCINE, NO PRSV, 30MCG/0.3ML, IM: CPT | Mod: S$GLB,,, | Performed by: INTERNAL MEDICINE

## 2021-03-13 RX ADMIN — Medication 0.3 ML: at 10:03

## 2021-04-03 ENCOUNTER — IMMUNIZATION (OUTPATIENT)
Dept: PRIMARY CARE CLINIC | Facility: CLINIC | Age: 36
End: 2021-04-03
Payer: OTHER GOVERNMENT

## 2021-04-03 DIAGNOSIS — Z23 NEED FOR VACCINATION: Primary | ICD-10-CM

## 2021-04-03 PROCEDURE — 0002A PR IMMUNIZ ADMIN, SARS-COV-2 COVID-19 VACC, 30MCG/0.3ML, 2ND DOSE: ICD-10-PCS | Mod: CV19,S$GLB,, | Performed by: INTERNAL MEDICINE

## 2021-04-03 PROCEDURE — 0002A PR IMMUNIZ ADMIN, SARS-COV-2 COVID-19 VACC, 30MCG/0.3ML, 2ND DOSE: CPT | Mod: CV19,S$GLB,, | Performed by: INTERNAL MEDICINE

## 2021-04-03 PROCEDURE — 91300 PR SARS-COV- 2 COVID-19 VACCINE, NO PRSV, 30MCG/0.3ML, IM: CPT | Mod: S$GLB,,, | Performed by: INTERNAL MEDICINE

## 2021-04-03 PROCEDURE — 91300 PR SARS-COV- 2 COVID-19 VACCINE, NO PRSV, 30MCG/0.3ML, IM: ICD-10-PCS | Mod: S$GLB,,, | Performed by: INTERNAL MEDICINE

## 2021-04-03 RX ADMIN — Medication 0.3 ML: at 11:04

## 2021-04-16 ENCOUNTER — OFFICE VISIT (OUTPATIENT)
Dept: ALLERGY | Facility: CLINIC | Age: 36
End: 2021-04-16
Payer: OTHER GOVERNMENT

## 2021-04-16 VITALS
WEIGHT: 170 LBS | SYSTOLIC BLOOD PRESSURE: 126 MMHG | DIASTOLIC BLOOD PRESSURE: 85 MMHG | HEART RATE: 67 BPM | HEIGHT: 67 IN | TEMPERATURE: 98 F | BODY MASS INDEX: 26.68 KG/M2

## 2021-04-16 DIAGNOSIS — J30.1 SEASONAL ALLERGIC RHINITIS DUE TO POLLEN: Primary | ICD-10-CM

## 2021-04-16 PROCEDURE — 99999 PR PBB SHADOW E&M-EST. PATIENT-LVL III: CPT | Mod: PBBFAC,,, | Performed by: ALLERGY & IMMUNOLOGY

## 2021-04-16 PROCEDURE — 99214 PR OFFICE/OUTPT VISIT, EST, LEVL IV, 30-39 MIN: ICD-10-PCS | Mod: S$GLB,,, | Performed by: ALLERGY & IMMUNOLOGY

## 2021-04-16 PROCEDURE — 99214 OFFICE O/P EST MOD 30 MIN: CPT | Mod: S$GLB,,, | Performed by: ALLERGY & IMMUNOLOGY

## 2021-04-16 PROCEDURE — 99999 PR PBB SHADOW E&M-EST. PATIENT-LVL III: ICD-10-PCS | Mod: PBBFAC,,, | Performed by: ALLERGY & IMMUNOLOGY

## 2021-04-17 ENCOUNTER — LAB VISIT (OUTPATIENT)
Dept: LAB | Facility: HOSPITAL | Age: 36
End: 2021-04-17
Attending: FAMILY MEDICINE
Payer: OTHER GOVERNMENT

## 2021-04-17 DIAGNOSIS — D50.9 IRON DEFICIENCY ANEMIA, UNSPECIFIED IRON DEFICIENCY ANEMIA TYPE: ICD-10-CM

## 2021-04-17 LAB
FERRITIN SERPL-MCNC: 16 NG/ML (ref 20–300)
IRON SERPL-MCNC: 74 UG/DL (ref 45–160)
SATURATED IRON: 18 % (ref 20–50)
TOTAL IRON BINDING CAPACITY: 401 UG/DL (ref 250–450)
TRANSFERRIN SERPL-MCNC: 271 MG/DL (ref 200–375)

## 2021-04-17 PROCEDURE — 83540 ASSAY OF IRON: CPT | Performed by: FAMILY MEDICINE

## 2021-04-17 PROCEDURE — 82728 ASSAY OF FERRITIN: CPT | Performed by: FAMILY MEDICINE

## 2021-04-17 PROCEDURE — 36415 COLL VENOUS BLD VENIPUNCTURE: CPT | Performed by: FAMILY MEDICINE

## 2021-04-21 ENCOUNTER — PATIENT MESSAGE (OUTPATIENT)
Dept: INTERNAL MEDICINE | Facility: CLINIC | Age: 36
End: 2021-04-21

## 2021-10-11 DIAGNOSIS — I10 ESSENTIAL HYPERTENSION: ICD-10-CM

## 2021-10-11 RX ORDER — LOSARTAN POTASSIUM 50 MG/1
TABLET ORAL
Qty: 90 TABLET | Refills: 3 | Status: SHIPPED | OUTPATIENT
Start: 2021-10-11 | End: 2022-01-05 | Stop reason: SDUPTHER

## 2021-10-15 ENCOUNTER — OFFICE VISIT (OUTPATIENT)
Dept: ALLERGY | Facility: CLINIC | Age: 36
End: 2021-10-15
Payer: OTHER GOVERNMENT

## 2021-10-15 VITALS
SYSTOLIC BLOOD PRESSURE: 133 MMHG | DIASTOLIC BLOOD PRESSURE: 93 MMHG | TEMPERATURE: 98 F | BODY MASS INDEX: 28.75 KG/M2 | WEIGHT: 183.19 LBS | HEART RATE: 73 BPM | HEIGHT: 67 IN

## 2021-10-15 DIAGNOSIS — J30.89 ALLERGIC RHINITIS DUE TO AMERICAN HOUSE DUST MITE: ICD-10-CM

## 2021-10-15 DIAGNOSIS — J30.1 SEASONAL ALLERGIC RHINITIS DUE TO POLLEN: Primary | ICD-10-CM

## 2021-10-15 DIAGNOSIS — J30.81 ALLERGIC RHINITIS DUE TO ANIMAL (CAT) (DOG) HAIR AND DANDER: ICD-10-CM

## 2021-10-15 PROCEDURE — 99214 PR OFFICE/OUTPT VISIT, EST, LEVL IV, 30-39 MIN: ICD-10-PCS | Mod: S$GLB,,, | Performed by: ALLERGY & IMMUNOLOGY

## 2021-10-15 PROCEDURE — 99999 PR PBB SHADOW E&M-EST. PATIENT-LVL III: CPT | Mod: PBBFAC,,, | Performed by: ALLERGY & IMMUNOLOGY

## 2021-10-15 PROCEDURE — 99214 OFFICE O/P EST MOD 30 MIN: CPT | Mod: S$GLB,,, | Performed by: ALLERGY & IMMUNOLOGY

## 2021-10-15 PROCEDURE — 99999 PR PBB SHADOW E&M-EST. PATIENT-LVL III: ICD-10-PCS | Mod: PBBFAC,,, | Performed by: ALLERGY & IMMUNOLOGY

## 2021-12-20 ENCOUNTER — PATIENT MESSAGE (OUTPATIENT)
Dept: INTERNAL MEDICINE | Facility: CLINIC | Age: 36
End: 2021-12-20
Payer: OTHER GOVERNMENT

## 2021-12-26 NOTE — PATIENT INSTRUCTIONS
Epinephrine injection (Auto-injector)  What is this medicine?  EPINEPHRINE (ep i TEO rin) is used for the emergency treatment of severe allergic reactions. You should keep this medicine with you at all times.  How should I use this medicine?  This medicine is for injection into the outer thigh. Your doctor or health care professional will instruct you on the proper use of the device during an emergency. Read all directions carefully and make sure you understand them. Do not use more often than directed.  Talk to your pediatrician regarding the use of this medicine in children. Special care may be needed. This drug is commonly used in children. A special device is available for use in children. If you are giving this medicine to a young child, hold their leg firmly in place before and during the injection to prevent injury.  What side effects may I notice from receiving this medicine?  Side effects that you should report to your doctor or health care professional as soon as possible:  · allergic reactions like skin rash, itching or hives, swelling of the face, lips, or tongue  · breathing problems  · chest pain  · fast, irregular heartbeat  · pain, tingling, numbness in the hands or feet  · pain, redness, or irritation at site where injected  · vomiting  Side effects that usually do not require medical attention (report to your doctor or health care professional if they continue or are bothersome):  · anxious  · dizziness  · dry mouth  · headache  · increased sweating  · nausea  · unusually weak or tired  What may interact with this medicine?  This medicine is only used during an emergency. Significant drug interactions are not likely during emergency use.  What if I miss a dose?  This does not apply. You should only use this medicine for an allergic reaction.  Where should I keep my medicine?  Keep out of the reach of children.  Store at room temperature between 15 and 30 degrees C (59 and 86 degrees F). Protect  from light and heat. The solution should be clear in color. If the solution is discolored or contains particles it must be replaced. Throw away any unused medicine after the expiration date. Ask your doctor or pharmacist about proper disposal of the injector if it is  or has been used. Always replace your auto-injector before it expires.  What should I tell my health care provider before I take this medicine?  They need to know if you have any of the following conditions:  · diabetes  · heart disease  · high blood pressure  · lung or breathing disease, like asthma  · Parkinson's disease  · thyroid disease  · an unusual or allergic reaction to epinephrine, sulfites, other medicines, foods, dyes, or preservatives  · pregnant or trying to get pregnant  · breast-feeding  What should I watch for while using this medicine?  Keep this medicine ready for use in the case of a severe allergic reaction. Make sure that you have the phone number of your doctor or health care professional and local hospital ready. Remember to check the expiration date of your medicine regularly. You may need to have additional units of this medicine with you at work, school, or other places. Talk to your doctor or health care professional about your need for extra units. Some emergencies may require an additional dose. Check with your doctor or a health care professional before using an extra dose.  After use, go to the nearest hospital or call 911. Avoid physical activity. Make sure the treating health care professional knows you have received an injection of this medicine. You will receive additional instructions on what to do during and after use of this medicine before a medical emergency occurs.  NOTE:This sheet is a summary. It may not cover all possible information. If you have questions about this medicine, talk to your doctor, pharmacist, or health care provider. Copyright© 2017 [employer]    Dust mites are microscopic  insect-like pests that live in house dust. They feed on dead skin or dander that are shed by people and pets.They can worsen asthma and allergies. Dust mites live in mattresses, bedding, upholstered furniture, carpets, and curtains in your home. No matter how clean a home is, dust mites cannot be completely eliminated. A number of things can be done to reduce the number of dust mites:  -Use a dehumidifier or air conditioner to maintain humidity levels at, or below, 50 percent.  -Encase mattress and pillows in dust mite- proof or allergen impermeable covers.  -Wash all bedding and blankets once a week in hot water, 130 to 140 degrees Fahrenheit, to kill dust mites. Non- washable bedding can be frozen overnight.  -Replace wool or feathered bedding products with synthetic materials, and traditional stuffed animals with washable ones.  -In bedrooms, replace wall to wall carpeting with bare floors, and remove fabric curtains and upholstered furniture, whenever possible.  -Use a damp mop or rag to remove dust. Never use a dry cloth, as it stirs up allergens.  -Use a double-layered microfilter bag or a HEPA filter in your vacuum .  -Wear a mask while vacuuming, and stay out of the vacuuming area for 20 minutes after vacuuming, to allow dust and allergens to settle.    Hepa air filter in your bedroom.  Pollen is the highest early morning. Avoid early morning activities, if possible.  Keep windows up and cars clean during pollen season.  Remove all clothing and shower at the end of the day to remove pollen.      used

## 2021-12-30 ENCOUNTER — OFFICE VISIT (OUTPATIENT)
Dept: INTERNAL MEDICINE | Facility: CLINIC | Age: 36
End: 2021-12-30
Payer: OTHER GOVERNMENT

## 2021-12-30 DIAGNOSIS — R00.2 PALPITATIONS: ICD-10-CM

## 2021-12-30 DIAGNOSIS — Z87.898 HISTORY OF TACHYCARDIA: Primary | ICD-10-CM

## 2021-12-30 DIAGNOSIS — I10 ESSENTIAL HYPERTENSION: ICD-10-CM

## 2021-12-30 DIAGNOSIS — D50.9 IRON DEFICIENCY ANEMIA, UNSPECIFIED IRON DEFICIENCY ANEMIA TYPE: ICD-10-CM

## 2021-12-30 DIAGNOSIS — E78.5 HYPERLIPIDEMIA, UNSPECIFIED HYPERLIPIDEMIA TYPE: ICD-10-CM

## 2021-12-30 PROCEDURE — 99213 PR OFFICE/OUTPT VISIT, EST, LEVL III, 20-29 MIN: ICD-10-PCS | Mod: 95,,, | Performed by: FAMILY MEDICINE

## 2021-12-30 PROCEDURE — 99213 OFFICE O/P EST LOW 20 MIN: CPT | Mod: 95,,, | Performed by: FAMILY MEDICINE

## 2022-01-05 ENCOUNTER — OFFICE VISIT (OUTPATIENT)
Dept: INTERNAL MEDICINE | Facility: CLINIC | Age: 37
End: 2022-01-05
Payer: OTHER GOVERNMENT

## 2022-01-05 VITALS
DIASTOLIC BLOOD PRESSURE: 86 MMHG | TEMPERATURE: 99 F | SYSTOLIC BLOOD PRESSURE: 132 MMHG | HEIGHT: 67 IN | OXYGEN SATURATION: 96 % | WEIGHT: 182.31 LBS | HEART RATE: 90 BPM | BODY MASS INDEX: 28.61 KG/M2

## 2022-01-05 DIAGNOSIS — K21.9 GASTROESOPHAGEAL REFLUX DISEASE: ICD-10-CM

## 2022-01-05 DIAGNOSIS — Z91.09 ENVIRONMENTAL ALLERGIES: ICD-10-CM

## 2022-01-05 DIAGNOSIS — Z00.00 ANNUAL PHYSICAL EXAM: Primary | ICD-10-CM

## 2022-01-05 DIAGNOSIS — E78.5 HYPERLIPIDEMIA, UNSPECIFIED HYPERLIPIDEMIA TYPE: ICD-10-CM

## 2022-01-05 DIAGNOSIS — I10 ESSENTIAL HYPERTENSION: ICD-10-CM

## 2022-01-05 DIAGNOSIS — D50.9 IRON DEFICIENCY ANEMIA, UNSPECIFIED IRON DEFICIENCY ANEMIA TYPE: ICD-10-CM

## 2022-01-05 PROCEDURE — 99999 PR PBB SHADOW E&M-EST. PATIENT-LVL III: ICD-10-PCS | Mod: PBBFAC,,, | Performed by: FAMILY MEDICINE

## 2022-01-05 PROCEDURE — 99999 PR PBB SHADOW E&M-EST. PATIENT-LVL III: CPT | Mod: PBBFAC,,, | Performed by: FAMILY MEDICINE

## 2022-01-05 PROCEDURE — 99395 PREV VISIT EST AGE 18-39: CPT | Mod: S$GLB,,, | Performed by: FAMILY MEDICINE

## 2022-01-05 PROCEDURE — 99395 PR PREVENTIVE VISIT,EST,18-39: ICD-10-PCS | Mod: S$GLB,,, | Performed by: FAMILY MEDICINE

## 2022-01-05 RX ORDER — LEVOCETIRIZINE DIHYDROCHLORIDE 5 MG/1
5 TABLET, FILM COATED ORAL DAILY
Qty: 90 TABLET | Refills: 3 | Status: SHIPPED | OUTPATIENT
Start: 2022-01-05 | End: 2022-06-27 | Stop reason: SDUPTHER

## 2022-01-05 RX ORDER — LOSARTAN POTASSIUM 50 MG/1
50 TABLET ORAL DAILY
Qty: 90 TABLET | Refills: 3 | Status: SHIPPED | OUTPATIENT
Start: 2022-01-05

## 2022-01-05 RX ORDER — OMEPRAZOLE 40 MG/1
40 CAPSULE, DELAYED RELEASE ORAL DAILY
Qty: 90 CAPSULE | Refills: 3 | Status: SHIPPED | OUTPATIENT
Start: 2022-01-05

## 2022-01-05 RX ORDER — FERROUS SULFATE 325(65) MG
TABLET ORAL
Qty: 90 TABLET | Refills: 3 | Status: SHIPPED | OUTPATIENT
Start: 2022-01-05 | End: 2022-01-05 | Stop reason: CLARIF
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
## 2022-01-07 LAB
ALBUMIN SERPL-MCNC: 4.7 G/DL (ref 3.6–5.1)
ALBUMIN/GLOB SERPL: 1.7 (CALC) (ref 1–2.5)
ALP SERPL-CCNC: 58 U/L (ref 36–130)
ALT SERPL-CCNC: 87 U/L (ref 9–46)
AST SERPL-CCNC: 37 U/L (ref 10–40)
BASOPHILS # BLD AUTO: 42 CELLS/UL (ref 0–200)
BASOPHILS NFR BLD AUTO: 0.8 %
BILIRUB SERPL-MCNC: 0.6 MG/DL (ref 0.2–1.2)
BUN SERPL-MCNC: 15 MG/DL (ref 7–25)
BUN/CREAT SERPL: ABNORMAL (CALC) (ref 6–22)
CALCIUM SERPL-MCNC: 9.8 MG/DL (ref 8.6–10.3)
CHLORIDE SERPL-SCNC: 102 MMOL/L (ref 98–110)
CHOLEST SERPL-MCNC: 175 MG/DL
CHOLEST/HDLC SERPL: 4.1 (CALC)
CO2 SERPL-SCNC: 29 MMOL/L (ref 20–32)
CREAT SERPL-MCNC: 0.85 MG/DL (ref 0.6–1.35)
EOSINOPHIL # BLD AUTO: 201 CELLS/UL (ref 15–500)
EOSINOPHIL NFR BLD AUTO: 3.8 %
ERYTHROCYTE [DISTWIDTH] IN BLOOD BY AUTOMATED COUNT: 12.9 % (ref 11–15)
FERRITIN SERPL-MCNC: 42 NG/ML (ref 38–380)
GLOBULIN SER CALC-MCNC: 2.7 G/DL (CALC) (ref 1.9–3.7)
GLUCOSE SERPL-MCNC: 100 MG/DL (ref 65–99)
HCT VFR BLD AUTO: 44 % (ref 38.5–50)
HDLC SERPL-MCNC: 43 MG/DL
HGB BLD-MCNC: 15.1 G/DL (ref 13.2–17.1)
IRON SATN MFR SERPL: 30 % (CALC) (ref 20–48)
IRON SERPL-MCNC: 98 MCG/DL (ref 50–180)
LDLC SERPL CALC-MCNC: 93 MG/DL (CALC)
LYMPHOCYTES # BLD AUTO: 1617 CELLS/UL (ref 850–3900)
LYMPHOCYTES NFR BLD AUTO: 30.5 %
MCH RBC QN AUTO: 30.1 PG (ref 27–33)
MCHC RBC AUTO-ENTMCNC: 34.3 G/DL (ref 32–36)
MCV RBC AUTO: 87.6 FL (ref 80–100)
MONOCYTES # BLD AUTO: 371 CELLS/UL (ref 200–950)
MONOCYTES NFR BLD AUTO: 7 %
NEUTROPHILS # BLD AUTO: 3069 CELLS/UL (ref 1500–7800)
NEUTROPHILS NFR BLD AUTO: 57.9 %
NONHDLC SERPL-MCNC: 132 MG/DL (CALC)
PLATELET # BLD AUTO: 215 THOUSAND/UL (ref 140–400)
PMV BLD REES-ECKER: 12.1 FL (ref 7.5–12.5)
POTASSIUM SERPL-SCNC: 4.1 MMOL/L (ref 3.5–5.3)
PROT SERPL-MCNC: 7.4 G/DL (ref 6.1–8.1)
RBC # BLD AUTO: 5.02 MILLION/UL (ref 4.2–5.8)
SODIUM SERPL-SCNC: 138 MMOL/L (ref 135–146)
TIBC SERPL-MCNC: 332 MCG/DL (CALC) (ref 250–425)
TRIGL SERPL-MCNC: 295 MG/DL
TSH SERPL-ACNC: 1.31 MIU/L (ref 0.4–4.5)
WBC # BLD AUTO: 5.3 THOUSAND/UL (ref 3.8–10.8)

## 2022-01-10 ENCOUNTER — TELEPHONE (OUTPATIENT)
Dept: INTERNAL MEDICINE | Facility: CLINIC | Age: 37
End: 2022-01-10
Payer: OTHER GOVERNMENT

## 2022-01-10 DIAGNOSIS — R74.01 ELEVATED ALT MEASUREMENT: Primary | ICD-10-CM

## 2022-01-10 DIAGNOSIS — R73.01 ABNORMAL FASTING GLUCOSE: ICD-10-CM

## 2022-01-19 ENCOUNTER — TELEPHONE (OUTPATIENT)
Dept: INTERNAL MEDICINE | Facility: CLINIC | Age: 37
End: 2022-01-19
Payer: OTHER GOVERNMENT

## 2022-01-19 DIAGNOSIS — R74.01 ELEVATED ALT MEASUREMENT: ICD-10-CM

## 2022-01-19 DIAGNOSIS — R73.01 ABNORMAL FASTING GLUCOSE: Primary | ICD-10-CM

## 2022-01-23 LAB
ALBUMIN SERPL-MCNC: 4.8 G/DL (ref 3.6–5.1)
ALBUMIN/GLOB SERPL: 1.7 (CALC) (ref 1–2.5)
ALP SERPL-CCNC: 61 U/L (ref 36–130)
ALT SERPL-CCNC: 72 U/L (ref 9–46)
AST SERPL-CCNC: 32 U/L (ref 10–40)
BILIRUB SERPL-MCNC: 0.5 MG/DL (ref 0.2–1.2)
BUN SERPL-MCNC: 13 MG/DL (ref 7–25)
BUN/CREAT SERPL: ABNORMAL (CALC) (ref 6–22)
CALCIUM SERPL-MCNC: 9.8 MG/DL (ref 8.6–10.3)
CHLORIDE SERPL-SCNC: 99 MMOL/L (ref 98–110)
CO2 SERPL-SCNC: 29 MMOL/L (ref 20–32)
CREAT SERPL-MCNC: 0.92 MG/DL (ref 0.6–1.35)
GLOBULIN SER CALC-MCNC: 2.8 G/DL (CALC) (ref 1.9–3.7)
GLUCOSE SERPL-MCNC: 89 MG/DL (ref 65–139)
HBA1C MFR BLD: 5.2 % OF TOTAL HGB
POTASSIUM SERPL-SCNC: 3.9 MMOL/L (ref 3.5–5.3)
PROT SERPL-MCNC: 7.6 G/DL (ref 6.1–8.1)
SODIUM SERPL-SCNC: 138 MMOL/L (ref 135–146)

## 2022-03-01 ENCOUNTER — PATIENT MESSAGE (OUTPATIENT)
Dept: OPHTHALMOLOGY | Facility: CLINIC | Age: 37
End: 2022-03-01

## 2022-03-01 ENCOUNTER — OFFICE VISIT (OUTPATIENT)
Dept: OPHTHALMOLOGY | Facility: CLINIC | Age: 37
End: 2022-03-01
Payer: OTHER GOVERNMENT

## 2022-03-01 DIAGNOSIS — H52.7 REFRACTIVE DISORDER: ICD-10-CM

## 2022-03-01 DIAGNOSIS — H35.413 BILATERAL RETINAL LATTICE DEGENERATION: ICD-10-CM

## 2022-03-01 DIAGNOSIS — H02.883 MEIBOMIAN GLAND DYSFUNCTION (MGD) OF BOTH EYES: Primary | ICD-10-CM

## 2022-03-01 DIAGNOSIS — H02.886 MEIBOMIAN GLAND DYSFUNCTION (MGD) OF BOTH EYES: Primary | ICD-10-CM

## 2022-03-01 PROCEDURE — 99213 PR OFFICE/OUTPT VISIT, EST, LEVL III, 20-29 MIN: ICD-10-PCS | Mod: S$GLB,,, | Performed by: STUDENT IN AN ORGANIZED HEALTH CARE EDUCATION/TRAINING PROGRAM

## 2022-03-01 PROCEDURE — 92015 PR REFRACTION: ICD-10-PCS | Mod: S$GLB,,, | Performed by: STUDENT IN AN ORGANIZED HEALTH CARE EDUCATION/TRAINING PROGRAM

## 2022-03-01 PROCEDURE — 92015 DETERMINE REFRACTIVE STATE: CPT | Mod: S$GLB,,, | Performed by: STUDENT IN AN ORGANIZED HEALTH CARE EDUCATION/TRAINING PROGRAM

## 2022-03-01 PROCEDURE — 99213 OFFICE O/P EST LOW 20 MIN: CPT | Mod: S$GLB,,, | Performed by: STUDENT IN AN ORGANIZED HEALTH CARE EDUCATION/TRAINING PROGRAM

## 2022-03-01 PROCEDURE — 99999 PR PBB SHADOW E&M-EST. PATIENT-LVL II: ICD-10-PCS | Mod: PBBFAC,,, | Performed by: STUDENT IN AN ORGANIZED HEALTH CARE EDUCATION/TRAINING PROGRAM

## 2022-03-01 PROCEDURE — 92134 CPTRZ OPH DX IMG PST SGM RTA: CPT | Mod: S$GLB,,, | Performed by: STUDENT IN AN ORGANIZED HEALTH CARE EDUCATION/TRAINING PROGRAM

## 2022-03-01 PROCEDURE — 92134 POSTERIOR SEGMENT OCT RETINA (OCULAR COHERENCE TOMOGRAPHY)-BOTH EYES: ICD-10-PCS | Mod: S$GLB,,, | Performed by: STUDENT IN AN ORGANIZED HEALTH CARE EDUCATION/TRAINING PROGRAM

## 2022-03-01 PROCEDURE — 99999 PR PBB SHADOW E&M-EST. PATIENT-LVL II: CPT | Mod: PBBFAC,,, | Performed by: STUDENT IN AN ORGANIZED HEALTH CARE EDUCATION/TRAINING PROGRAM

## 2022-03-01 NOTE — PROGRESS NOTES
HPI     Pt was told to rtc 1 year for DFE & mOCT.  Patient states his vision has been stable since his last visit. Pt denies   any ocular pain or discomfort.     MGD OU  Bilateral retinal lattice degeneration   Refractive disorder      Last edited by Maryanne Bronson on 3/1/2022  8:47 AM. (History)            Assessment /Plan     For exam results, see Encounter Report.    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   Discussed retinal detachment signs and symptoms including flashes of lights, floaters, perceived curtains or veils. Advised to patient to monitor visual status including increase in flashes and floaters, or the development of visual field changes including curtain and /or veils. Advised patient to RTC urgently if these symptoms occur. Explained the need for follow up exams to the patient even if there are no changes in the symptoms.      Refractive disorder- MRx finialized      Return to clinic in 1 year or PRN

## 2022-06-27 ENCOUNTER — OFFICE VISIT (OUTPATIENT)
Dept: ALLERGY | Facility: CLINIC | Age: 37
End: 2022-06-27
Payer: OTHER GOVERNMENT

## 2022-06-27 VITALS
TEMPERATURE: 97 F | BODY MASS INDEX: 28.38 KG/M2 | WEIGHT: 181.19 LBS | DIASTOLIC BLOOD PRESSURE: 85 MMHG | SYSTOLIC BLOOD PRESSURE: 128 MMHG | HEART RATE: 66 BPM

## 2022-06-27 DIAGNOSIS — J30.89 ALLERGIC RHINITIS DUE TO AMERICAN HOUSE DUST MITE: ICD-10-CM

## 2022-06-27 DIAGNOSIS — J30.81 ALLERGIC RHINITIS DUE TO ANIMAL (CAT) (DOG) HAIR AND DANDER: ICD-10-CM

## 2022-06-27 DIAGNOSIS — J30.1 SEASONAL ALLERGIC RHINITIS DUE TO POLLEN: Primary | ICD-10-CM

## 2022-06-27 PROCEDURE — 99214 OFFICE O/P EST MOD 30 MIN: CPT | Mod: S$GLB,,, | Performed by: ALLERGY & IMMUNOLOGY

## 2022-06-27 PROCEDURE — 99214 PR OFFICE/OUTPT VISIT, EST, LEVL IV, 30-39 MIN: ICD-10-PCS | Mod: S$GLB,,, | Performed by: ALLERGY & IMMUNOLOGY

## 2022-06-27 PROCEDURE — 99999 PR PBB SHADOW E&M-EST. PATIENT-LVL III: CPT | Mod: PBBFAC,,, | Performed by: ALLERGY & IMMUNOLOGY

## 2022-06-27 PROCEDURE — 99999 PR PBB SHADOW E&M-EST. PATIENT-LVL III: ICD-10-PCS | Mod: PBBFAC,,, | Performed by: ALLERGY & IMMUNOLOGY

## 2022-06-27 RX ORDER — LEVOCETIRIZINE DIHYDROCHLORIDE 5 MG/1
5 TABLET, FILM COATED ORAL DAILY
Qty: 90 TABLET | Refills: 3 | Status: SHIPPED | OUTPATIENT
Start: 2022-06-27 | End: 2023-06-27 | Stop reason: SDUPTHER

## 2022-06-27 RX ORDER — FERROUS SULFATE 325(65) MG
325 TABLET, DELAYED RELEASE (ENTERIC COATED) ORAL
COMMUNITY

## 2022-06-27 NOTE — PROGRESS NOTES
Subjective:       Patient ID: Manoj Gutierrez is a 36 y.o. male.    Chief Complaint:  Follow-up      HPI 12/17/2020: 35 year old male with a history of allergic rhinitis on allergy immunotherpay. He had a systemic reaction on 12/8/2020 requiring 3 doses of epinephrine. He was transferred to the Hospital via EMS and monitored in the ER for 4 hours.  He reports that he did appreciate an improvement in Fall seasonal allergies. He pet his sister's cat and he did not have symptoms, which he has in the past.    Cetirizine, Cozzarr, Omeprazole every morning  Xyzal during allergy season      HPI 4/16/2021:  No runny nose, nasal congestion, itchy or watery eyes.  Xzyal daily      HPI 10/15/2021:  Doing well.  Taking Xyzal  NO runny nose, no nasal congestion  occasional flares in the evening      HPI today: 36 year old male with a history of allergic rhinitis previously on allergy immunotherapy- stopped due to significant systemic reaction.  No significant allergy symptoms this season  taking Xyzal every evning  No runny nose, no nasal congestion      Past Medical History:   Diagnosis Date    Allergy     Elevated transaminase level     Hyperlipidemia     Overweight (BMI 25.0-29.9)      Family History   Problem Relation Age of Onset    Asthma Sister      Current Outpatient Medications on File Prior to Visit   Medication Sig Dispense Refill    alirocumab (PRALUENT PEN) 75 mg/mL PnIj Inject 1 mL (75 mg total) into the skin every 14 (fourteen) days. 2 mL 11    ferrous sulfate 325 (65 FE) MG EC tablet Take 325 mg by mouth 3 (three) times daily with meals.      losartan (COZAAR) 50 MG tablet Take 1 tablet (50 mg total) by mouth once daily. 90 tablet 3    omeprazole (PRILOSEC) 40 MG capsule Take 1 capsule (40 mg total) by mouth once daily. 90 capsule 3    [DISCONTINUED] levocetirizine (XYZAL) 5 MG tablet Take 1 tablet (5 mg total) by mouth once daily. 90 tablet 3    EPINEPHrine (EPIPEN) 0.3 mg/0.3 mL AtIn Inject 0.3 mLs  (0.3 mg total) into the muscle once. for 1 dose 2 Device 3    triamcinolone acetonide 0.1% (KENALOG) 0.1 % cream Apply topically 2 (two) times daily. (Patient not taking: Reported on 6/27/2022) 45 g 2     No current facility-administered medications on file prior to visit.     Review of patient's allergies indicates:   Allergen Reactions    Cat/feline products     Grass pollen-june grass standard Other (See Comments)    Oak derivatives        Environmental History: Dog, fish  Review of Systems   Constitutional: Negative for chills and fever.   HENT: Negative for congestion and rhinorrhea.    Eyes: Negative for discharge and itching.   Respiratory: Negative for chest tightness, shortness of breath and wheezing.    Cardiovascular: Negative for chest pain and leg swelling.   Gastrointestinal: Negative for nausea and vomiting.   Skin: Negative for rash and wound.   Allergic/Immunologic: Positive for environmental allergies. Negative for food allergies.   Neurological: Negative for facial asymmetry and speech difficulty.   Hematological: Negative for adenopathy. Does not bruise/bleed easily.   Psychiatric/Behavioral: Negative for behavioral problems and suicidal ideas.        Objective:    Physical Exam  Constitutional:       General: He is not in acute distress.     Appearance: Normal appearance. He is not ill-appearing, toxic-appearing or diaphoretic.   HENT:      Head: Normocephalic and atraumatic.      Right Ear: Tympanic membrane, ear canal and external ear normal. There is no impacted cerumen.      Left Ear: Tympanic membrane, ear canal and external ear normal. There is no impacted cerumen.      Nose: Nose normal. No congestion or rhinorrhea.      Mouth/Throat:      Mouth: Mucous membranes are moist.      Pharynx: Oropharynx is clear. No oropharyngeal exudate or posterior oropharyngeal erythema.   Eyes:      General: No scleral icterus.        Right eye: No discharge.         Left eye: No discharge.       Pupils: Pupils are equal, round, and reactive to light.   Neck:      Thyroid: No thyromegaly.   Cardiovascular:      Rate and Rhythm: Normal rate and regular rhythm.      Heart sounds: Normal heart sounds. No murmur heard.    No friction rub. No gallop.   Pulmonary:      Effort: Pulmonary effort is normal. No respiratory distress.      Breath sounds: Normal breath sounds. No stridor. No wheezing, rhonchi or rales.   Chest:      Chest wall: No tenderness.   Abdominal:      General: Bowel sounds are normal. There is no distension.      Palpations: Abdomen is soft. There is no mass.      Tenderness: There is no abdominal tenderness. There is no guarding or rebound.      Hernia: No hernia is present.   Musculoskeletal:         General: No swelling or deformity. Normal range of motion.      Cervical back: Normal range of motion and neck supple. No rigidity. No muscular tenderness.      Right lower leg: No edema.      Left lower leg: No edema.   Lymphadenopathy:      Cervical: No cervical adenopathy.   Skin:     General: Skin is warm and dry.      Coloration: Skin is not jaundiced.      Findings: No bruising or rash.   Neurological:      Mental Status: He is alert and oriented to person, place, and time.      Gait: Gait normal.   Psychiatric:         Mood and Affect: Mood normal.         Behavior: Behavior normal.         Thought Content: Thought content normal.           Assessment:       1. Seasonal allergic rhinitis due to pollen    2. Allergic rhinitis due to animal (cat) (dog) hair and dander    3. Allergic rhinitis due to American house dust mite         Plan:       Seasonal allergic rhinitis due to pollen  -     levocetirizine (XYZAL) 5 MG tablet; Take 1 tablet (5 mg total) by mouth once daily.  Dispense: 90 tablet; Refill: 3    Allergic rhinitis due to animal (cat) (dog) hair and dander  -     levocetirizine (XYZAL) 5 MG tablet; Take 1 tablet (5 mg total) by mouth once daily.  Dispense: 90 tablet; Refill:  3    Allergic rhinitis due to American house dust mite  -     levocetirizine (XYZAL) 5 MG tablet; Take 1 tablet (5 mg total) by mouth once daily.  Dispense: 90 tablet; Refill: 3      History of allergy immunotherapy, but stopped due to anaphylaxis to the injection.  Doing well with Xyzal only. Recommend he continue.    RTC 1 year or sooner, if needed    DOMINGA DRISCOLL spent a total of 30 minutes on the day of the visit.  This includes face to face time and non-face to face time preparing to see the patient (eg, review of tests), obtaining and/or reviewing separately obtained history, documenting clinical information in the electronic or other health record, independently interpreting results and communicating results to the patient/family/caregiver, or care coordinator.

## 2023-06-27 ENCOUNTER — OFFICE VISIT (OUTPATIENT)
Dept: ALLERGY | Facility: CLINIC | Age: 38
End: 2023-06-27
Payer: OTHER GOVERNMENT

## 2023-06-27 VITALS
WEIGHT: 184.5 LBS | SYSTOLIC BLOOD PRESSURE: 131 MMHG | HEART RATE: 68 BPM | TEMPERATURE: 99 F | HEIGHT: 67 IN | DIASTOLIC BLOOD PRESSURE: 83 MMHG | BODY MASS INDEX: 28.96 KG/M2

## 2023-06-27 DIAGNOSIS — J30.1 SEASONAL ALLERGIC RHINITIS DUE TO POLLEN: Primary | ICD-10-CM

## 2023-06-27 DIAGNOSIS — J30.81 ALLERGIC RHINITIS DUE TO ANIMAL (CAT) (DOG) HAIR AND DANDER: ICD-10-CM

## 2023-06-27 DIAGNOSIS — J30.89 ALLERGIC RHINITIS DUE TO AMERICAN HOUSE DUST MITE: ICD-10-CM

## 2023-06-27 PROCEDURE — 99999 PR PBB SHADOW E&M-EST. PATIENT-LVL III: ICD-10-PCS | Mod: PBBFAC,,, | Performed by: ALLERGY & IMMUNOLOGY

## 2023-06-27 PROCEDURE — 99214 OFFICE O/P EST MOD 30 MIN: CPT | Mod: S$GLB,,, | Performed by: ALLERGY & IMMUNOLOGY

## 2023-06-27 PROCEDURE — 99999 PR PBB SHADOW E&M-EST. PATIENT-LVL III: CPT | Mod: PBBFAC,,, | Performed by: ALLERGY & IMMUNOLOGY

## 2023-06-27 PROCEDURE — 99214 PR OFFICE/OUTPT VISIT, EST, LEVL IV, 30-39 MIN: ICD-10-PCS | Mod: S$GLB,,, | Performed by: ALLERGY & IMMUNOLOGY

## 2023-06-27 RX ORDER — LEVOCETIRIZINE DIHYDROCHLORIDE 5 MG/1
5 TABLET, FILM COATED ORAL DAILY
Qty: 90 TABLET | Refills: 3 | Status: SHIPPED | OUTPATIENT
Start: 2023-06-27

## 2023-06-27 RX ORDER — OMEGA-3-ACID ETHYL ESTERS 1 G/1
2 CAPSULE, LIQUID FILLED ORAL 2 TIMES DAILY
COMMUNITY
Start: 2023-04-01

## 2023-06-27 NOTE — PROGRESS NOTES
Subjective:       Patient ID: Manoj Gutierrez is a 37 y.o. male.    Chief Complaint:  Follow-up      HPI 12/17/2020: 35 year old male with a history of allergic rhinitis on allergy immunotherpay. He had a systemic reaction on 12/8/2020 requiring 3 doses of epinephrine. He was transferred to the Hospital via EMS and monitored in the ER for 4 hours.  He reports that he did appreciate an improvement in Fall seasonal allergies. He pet his sister's cat and he did not have symptoms, which he has in the past.    Cetirizine, Cozzarr, Omeprazole every morning  Xyzal during allergy season      HPI 4/16/2021:  No runny nose, nasal congestion, itchy or watery eyes.  Xzyal daily      HPI 10/15/2021:  Doing well.  Taking Xyzal  NO runny nose, no nasal congestion  occasional flares in the evening      HPI 6/27/2023: 36 year old male with a history of allergic rhinitis previously on allergy immunotherapy- stopped due to significant systemic reaction.  No significant allergy symptoms this season  taking Xyzal every evning  No runny nose, no nasal congestion      HPI 6/27/2023:  He reports symptoms have been great.  Doing well  Xyzal daily    Past Medical History:   Diagnosis Date    Allergy     Elevated transaminase level     Hyperlipidemia     Overweight (BMI 25.0-29.9)      Family History   Problem Relation Age of Onset    Asthma Sister      Current Outpatient Medications on File Prior to Visit   Medication Sig Dispense Refill    alirocumab (PRALUENT PEN) 75 mg/mL PnIj Inject 1 mL (75 mg total) into the skin every 14 (fourteen) days. 2 mL 11    ferrous sulfate 325 (65 FE) MG EC tablet Take 325 mg by mouth 3 (three) times daily with meals.      losartan (COZAAR) 50 MG tablet Take 1 tablet (50 mg total) by mouth once daily. 90 tablet 3    omega-3 acid ethyl esters (LOVAZA) 1 gram capsule Take 2 capsules by mouth 2 (two) times daily.      omeprazole (PRILOSEC) 40 MG capsule Take 1 capsule (40 mg total) by mouth once daily. 90  capsule 3    triamcinolone acetonide 0.1% (KENALOG) 0.1 % cream Apply topically 2 (two) times daily. 45 g 2    [DISCONTINUED] levocetirizine (XYZAL) 5 MG tablet Take 1 tablet (5 mg total) by mouth once daily. 90 tablet 3    [DISCONTINUED] EPINEPHrine (EPIPEN) 0.3 mg/0.3 mL AtIn Inject 0.3 mLs (0.3 mg total) into the muscle once. for 1 dose 2 Device 3     No current facility-administered medications on file prior to visit.     Review of patient's allergies indicates:   Allergen Reactions    Cat/feline products     Grass pollen-june grass standard Other (See Comments)    Oak derivatives        Environmental History:  Dog, fish  Review of Systems   Constitutional:  Negative for chills and fever.   HENT:  Negative for congestion and rhinorrhea.    Eyes:  Negative for discharge and itching.   Respiratory:  Negative for chest tightness, shortness of breath and wheezing.    Cardiovascular:  Negative for chest pain and leg swelling.   Gastrointestinal:  Negative for nausea and vomiting.   Skin:  Negative for rash and wound.   Allergic/Immunologic: Positive for environmental allergies. Negative for food allergies.   Neurological:  Negative for facial asymmetry and speech difficulty.   Hematological:  Negative for adenopathy. Does not bruise/bleed easily.   Psychiatric/Behavioral:  Negative for behavioral problems and suicidal ideas.       Objective:    Physical Exam  Constitutional:       General: He is not in acute distress.     Appearance: Normal appearance. He is not ill-appearing, toxic-appearing or diaphoretic.   HENT:      Head: Normocephalic and atraumatic.      Right Ear: Tympanic membrane, ear canal and external ear normal. There is no impacted cerumen.      Left Ear: Tympanic membrane, ear canal and external ear normal. There is no impacted cerumen.      Nose: Nose normal. No congestion or rhinorrhea.      Mouth/Throat:      Mouth: Mucous membranes are moist.      Pharynx: Oropharynx is clear. No oropharyngeal  exudate or posterior oropharyngeal erythema.   Eyes:      General: No scleral icterus.        Right eye: No discharge.         Left eye: No discharge.      Pupils: Pupils are equal, round, and reactive to light.   Neck:      Thyroid: No thyromegaly.   Cardiovascular:      Rate and Rhythm: Normal rate and regular rhythm.      Heart sounds: Normal heart sounds. No murmur heard.    No friction rub. No gallop.   Pulmonary:      Effort: Pulmonary effort is normal. No respiratory distress.      Breath sounds: Normal breath sounds. No stridor. No wheezing, rhonchi or rales.   Chest:      Chest wall: No tenderness.   Musculoskeletal:         General: No swelling or deformity. Normal range of motion.      Cervical back: Normal range of motion and neck supple. No rigidity. No muscular tenderness.   Lymphadenopathy:      Cervical: No cervical adenopathy.   Skin:     General: Skin is warm and dry.      Coloration: Skin is not jaundiced or pale.      Findings: No bruising, erythema or rash.   Neurological:      General: No focal deficit present.      Mental Status: He is alert and oriented to person, place, and time.      Gait: Gait normal.   Psychiatric:         Mood and Affect: Mood normal.         Behavior: Behavior normal.         Thought Content: Thought content normal.         Assessment:       1. Seasonal allergic rhinitis due to pollen    2. Allergic rhinitis due to animal (cat) (dog) hair and dander    3. Allergic rhinitis due to [place] house dust mite           Plan:       Seasonal allergic rhinitis due to pollen  -     levocetirizine (XYZAL) 5 MG tablet; Take 1 tablet (5 mg total) by mouth once daily.  Dispense: 90 tablet; Refill: 3    Allergic rhinitis due to animal (cat) (dog) hair and dander  -     levocetirizine (XYZAL) 5 MG tablet; Take 1 tablet (5 mg total) by mouth once daily.  Dispense: 90 tablet; Refill: 3    Allergic rhinitis due to [place] house dust mite  -     levocetirizine (XYZAL) 5 MG tablet; Take 1  tablet (5 mg total) by mouth once daily.  Dispense: 90 tablet; Refill: 3        History of allergy immunotherapy, but stopped due to anaphylaxis to the injection.  Doing well with Xyzal only. Recommend he continue.    RTC 1 year or sooner, if needed    DOMINGA DRISCOLL                         Problems Address                                                 Amount and/or Complexity                                                                      Risk       3           [] 2 or more self-limited or minor problems                      [] Limited                                                                        [] Low                  [] 1 stable chronic illness                                                  Any combination of the two                                               OTC drugs                  []Acute, uncomplicated illness or injury                            Review of prior external notes from unique source           Minor surgery with no risk factors                                                                                                               [] 1 []2  []3+                                                                                                              Review of results from each unique test                                                                                                               [] 1 []2  [] 3+                                                                                                              Order of each unique test                                                                                                               [] 1 []2  [] 3+                                                                                                              Or                                                                                                             [] Assessment requiring an independent historian      4            [] One or more  chronic illness with exacerbation,              [] Moderate                                                                      [x] Moderate                 Progression, or side effects of treatment                            -test documents or independent historians                        Prescription drug management                [x]  2 or more stable chronic illnesses                                    [] Independent interpretation of tests                              Minor surgery with identifiable risk                [] 1 undiagnosed new problem with uncertain prognosis    [] Discussion or management of test results                    elective major surgery                [] 1 acute illness with                systemic symptoms                                                                                                                                                              [] 1 acute complicated injury                                                                                                                                          Elective major surgery                                                                                                                                                                                                                                                                                                                                                                                                  5            [] 1 or more chronic illnesses with severe exacerbation,     [] Extensive(two from below)                                         [] High                                                                                                               [] Independent interpretation of results                         Drug therapy requiring intensive                                                                                                                []Discussion of management or test interpretation           monitoring                                                                                                                                                                                                       Decision to de-escalate care                 [] 1 acute or chronic illness or injury that poses a threat                                                                                               Decision regarding hospitalization

## 2024-11-22 NOTE — PROGRESS NOTES
Subjective:       Patient ID: Manoj Gutierrez is a 34 y.o. male.    Chief Complaint:  Allergic Rhinitis  (itchy watery eyes, runny nose, sneezing, PND )      35 yo man presents for new patient evaluation of allergies. He states had for years. He has sneeze, runny nose, itchy nose and eyes and sometimes upper palate, he has nasal congestion and PND as well. No chest tightness, SOB or wheeze. He is typically worse in sprig and better by now but this year still going on. Worse during day so about noon to night. Worse outside. Worse around cats and dust. No H/o asthma or eczema. He had test years ago and allergic to cat and grass but also oat. He eats oat and fine. No insect or latex allergy. He is on zyrtec and helps some. Changed from xyzal which was not working. He used Flonase and not much help. was on Dymista but stopped working. Using OTC Mucinex spray and ut helps but burns. No other medical issues. He did have sinuplasty and helped some.       Environmental History: see history section for home environment  Review of Systems   Constitutional: Negative for activity change, appetite change, chills, fatigue, fever and unexpected weight change.   HENT: Positive for congestion, postnasal drip, rhinorrhea, sinus pressure and sneezing. Negative for ear discharge, ear pain, facial swelling, hearing loss, mouth sores, nosebleeds, sore throat, tinnitus, trouble swallowing and voice change.    Eyes: Positive for discharge, redness and itching. Negative for visual disturbance.   Respiratory: Negative for cough, chest tightness, shortness of breath and wheezing.    Cardiovascular: Negative for chest pain, palpitations and leg swelling.   Gastrointestinal: Negative for abdominal distention, abdominal pain, constipation, diarrhea, nausea and vomiting.   Genitourinary: Negative for difficulty urinating.   Musculoskeletal: Negative for arthralgias, back pain, joint swelling and myalgias.   Skin: Negative for color change,  pallor and rash.   Neurological: Negative for dizziness, tremors, speech difficulty, weakness, light-headedness and headaches.   Hematological: Negative for adenopathy. Does not bruise/bleed easily.   Psychiatric/Behavioral: Negative for agitation, confusion, decreased concentration and sleep disturbance. The patient is not nervous/anxious.         Objective:      Physical Exam  Vitals signs and nursing note reviewed.   Constitutional:       General: He is not in acute distress.     Appearance: He is well-developed.   HENT:      Head: Normocephalic and atraumatic.      Right Ear: Hearing, tympanic membrane, ear canal and external ear normal.      Left Ear: Hearing, tympanic membrane, ear canal and external ear normal.      Nose: No septal deviation, mucosal edema (pink turbinates) or rhinorrhea.      Mouth/Throat:      Pharynx: No uvula swelling.   Eyes:      General:         Right eye: No discharge.         Left eye: No discharge.      Conjunctiva/sclera: Conjunctivae normal.   Neck:      Musculoskeletal: Normal range of motion.      Thyroid: No thyromegaly.   Cardiovascular:      Rate and Rhythm: Normal rate and regular rhythm.      Heart sounds: Normal heart sounds. No murmur.   Pulmonary:      Effort: Pulmonary effort is normal. No respiratory distress.      Breath sounds: Normal breath sounds. No wheezing.   Abdominal:      General: There is no distension.      Palpations: Abdomen is soft.      Tenderness: There is no abdominal tenderness.   Musculoskeletal: Normal range of motion.   Lymphadenopathy:      Cervical: No cervical adenopathy.   Skin:     General: Skin is warm and dry.      Findings: No erythema or rash.   Neurological:      Mental Status: He is alert and oriented to person, place, and time.      Coordination: Coordination normal.   Psychiatric:         Behavior: Behavior normal.         Thought Content: Thought content normal.         Judgment: Judgment normal.         Laboratory:   none performed    Assessment:       1. Chronic rhinitis    2. Environmental allergies    3. Simple chronic conjunctivitis of both eyes         Plan:       1. advised pt symptoms can be allergic rhinitis vs chronic non allergic rhinitis, will send immunocaps  2. Trial Nasonex 2 SEN daily and continue zyrtec, he is worried about montelukast due to possible depression side effect as he has anxiety  3. He is interested in IT, will discuss further after tests  4. Phone review        Eliel Mcqueen RN CCM